# Patient Record
Sex: MALE | Race: WHITE | NOT HISPANIC OR LATINO | Employment: FULL TIME | URBAN - METROPOLITAN AREA
[De-identification: names, ages, dates, MRNs, and addresses within clinical notes are randomized per-mention and may not be internally consistent; named-entity substitution may affect disease eponyms.]

---

## 2021-06-10 ENCOUNTER — APPOINTMENT (OUTPATIENT)
Dept: CT IMAGING | Facility: CLINIC | Age: 36
DRG: 372 | End: 2021-06-10
Attending: EMERGENCY MEDICINE
Payer: COMMERCIAL

## 2021-06-10 ENCOUNTER — HOSPITAL ENCOUNTER (INPATIENT)
Facility: CLINIC | Age: 36
LOS: 3 days | Discharge: HOME | DRG: 372 | End: 2021-06-13
Attending: EMERGENCY MEDICINE | Admitting: INTERNAL MEDICINE
Payer: COMMERCIAL

## 2021-06-10 DIAGNOSIS — Z11.52 ENCOUNTER FOR SCREENING LABORATORY TESTING FOR SEVERE ACUTE RESPIRATORY SYNDROME CORONAVIRUS 2 (SARS-COV-2): ICD-10-CM

## 2021-06-10 DIAGNOSIS — K57.20 DIVERTICULITIS OF LARGE INTESTINE WITH PERFORATION WITHOUT BLEEDING: ICD-10-CM

## 2021-06-10 LAB
ALBUMIN SERPL-MCNC: 3.7 G/DL (ref 3.4–5)
ALBUMIN UR-MCNC: NEGATIVE MG/DL
ALP SERPL-CCNC: 74 U/L (ref 40–150)
ALT SERPL W P-5'-P-CCNC: 33 U/L (ref 0–70)
ANION GAP SERPL CALCULATED.3IONS-SCNC: 7 MMOL/L (ref 3–14)
APPEARANCE UR: CLEAR
AST SERPL W P-5'-P-CCNC: 16 U/L (ref 0–45)
BILIRUB SERPL-MCNC: 1 MG/DL (ref 0.2–1.3)
BILIRUB UR QL STRIP: NEGATIVE
BUN SERPL-MCNC: 8 MG/DL (ref 7–30)
CALCIUM SERPL-MCNC: 9.6 MG/DL (ref 8.5–10.1)
CHLORIDE SERPL-SCNC: 99 MMOL/L (ref 94–109)
CO2 SERPL-SCNC: 27 MMOL/L (ref 20–32)
COLOR UR AUTO: YELLOW
CREAT SERPL-MCNC: 0.94 MG/DL (ref 0.66–1.25)
GFR SERPL CREATININE-BSD FRML MDRD: >90 ML/MIN/{1.73_M2}
GLUCOSE SERPL-MCNC: 103 MG/DL (ref 70–99)
GLUCOSE UR STRIP-MCNC: NEGATIVE MG/DL
HGB UR QL STRIP: NEGATIVE
KETONES UR STRIP-MCNC: 5 MG/DL
LABORATORY COMMENT REPORT: NORMAL
LACTATE BLD-SCNC: 1.1 MMOL/L (ref 0.7–2)
LEUKOCYTE ESTERASE UR QL STRIP: NEGATIVE
NITRATE UR QL: NEGATIVE
PH UR STRIP: 9 PH (ref 5–7)
POTASSIUM SERPL-SCNC: 3.4 MMOL/L (ref 3.4–5.3)
PROT SERPL-MCNC: 7.9 G/DL (ref 6.8–8.8)
SARS-COV-2 RNA RESP QL NAA+PROBE: NEGATIVE
SODIUM SERPL-SCNC: 133 MMOL/L (ref 133–144)
SOURCE: ABNORMAL
SP GR UR STRIP: 1 (ref 1–1.03)
SPECIMEN SOURCE: NORMAL
UROBILINOGEN UR STRIP-MCNC: 0 MG/DL (ref 0–2)

## 2021-06-10 PROCEDURE — 74177 CT ABD & PELVIS W/CONTRAST: CPT

## 2021-06-10 PROCEDURE — 250N000009 HC RX 250: Performed by: EMERGENCY MEDICINE

## 2021-06-10 PROCEDURE — 87635 SARS-COV-2 COVID-19 AMP PRB: CPT | Performed by: EMERGENCY MEDICINE

## 2021-06-10 PROCEDURE — 250N000013 HC RX MED GY IP 250 OP 250 PS 637: Performed by: INTERNAL MEDICINE

## 2021-06-10 PROCEDURE — 99221 1ST HOSP IP/OBS SF/LOW 40: CPT | Mod: AI | Performed by: INTERNAL MEDICINE

## 2021-06-10 PROCEDURE — 87506 IADNA-DNA/RNA PROBE TQ 6-11: CPT | Performed by: EMERGENCY MEDICINE

## 2021-06-10 PROCEDURE — 96375 TX/PRO/DX INJ NEW DRUG ADDON: CPT | Performed by: EMERGENCY MEDICINE

## 2021-06-10 PROCEDURE — 99207 PR CDG-HISTORY COMPONENT: MEETS DETAILED - DOWN CODED LACK OF ROS: CPT | Performed by: INTERNAL MEDICINE

## 2021-06-10 PROCEDURE — 99285 EMERGENCY DEPT VISIT HI MDM: CPT | Performed by: EMERGENCY MEDICINE

## 2021-06-10 PROCEDURE — 96365 THER/PROPH/DIAG IV INF INIT: CPT | Mod: 59 | Performed by: EMERGENCY MEDICINE

## 2021-06-10 PROCEDURE — 258N000003 HC RX IP 258 OP 636: Performed by: EMERGENCY MEDICINE

## 2021-06-10 PROCEDURE — 250N000011 HC RX IP 250 OP 636: Performed by: INTERNAL MEDICINE

## 2021-06-10 PROCEDURE — 96361 HYDRATE IV INFUSION ADD-ON: CPT | Performed by: EMERGENCY MEDICINE

## 2021-06-10 PROCEDURE — 80053 COMPREHEN METABOLIC PANEL: CPT | Performed by: EMERGENCY MEDICINE

## 2021-06-10 PROCEDURE — 81001 URINALYSIS AUTO W/SCOPE: CPT | Performed by: EMERGENCY MEDICINE

## 2021-06-10 PROCEDURE — 83605 ASSAY OF LACTIC ACID: CPT | Performed by: EMERGENCY MEDICINE

## 2021-06-10 PROCEDURE — C9803 HOPD COVID-19 SPEC COLLECT: HCPCS | Performed by: EMERGENCY MEDICINE

## 2021-06-10 PROCEDURE — 99285 EMERGENCY DEPT VISIT HI MDM: CPT | Mod: 25 | Performed by: EMERGENCY MEDICINE

## 2021-06-10 PROCEDURE — 250N000011 HC RX IP 250 OP 636: Performed by: EMERGENCY MEDICINE

## 2021-06-10 PROCEDURE — 120N000001 HC R&B MED SURG/OB

## 2021-06-10 PROCEDURE — 96376 TX/PRO/DX INJ SAME DRUG ADON: CPT | Performed by: EMERGENCY MEDICINE

## 2021-06-10 RX ORDER — IOPAMIDOL 755 MG/ML
500 INJECTION, SOLUTION INTRAVASCULAR ONCE
Status: COMPLETED | OUTPATIENT
Start: 2021-06-10 | End: 2021-06-10

## 2021-06-10 RX ORDER — SODIUM CHLORIDE 9 MG/ML
INJECTION, SOLUTION INTRAVENOUS CONTINUOUS
Status: DISCONTINUED | OUTPATIENT
Start: 2021-06-10 | End: 2021-06-11

## 2021-06-10 RX ORDER — NALOXONE HYDROCHLORIDE 0.4 MG/ML
0.2 INJECTION, SOLUTION INTRAMUSCULAR; INTRAVENOUS; SUBCUTANEOUS
Status: DISCONTINUED | OUTPATIENT
Start: 2021-06-10 | End: 2021-06-13 | Stop reason: HOSPADM

## 2021-06-10 RX ORDER — NALOXONE HYDROCHLORIDE 0.4 MG/ML
0.4 INJECTION, SOLUTION INTRAMUSCULAR; INTRAVENOUS; SUBCUTANEOUS
Status: DISCONTINUED | OUTPATIENT
Start: 2021-06-10 | End: 2021-06-13 | Stop reason: HOSPADM

## 2021-06-10 RX ORDER — HYDROMORPHONE HYDROCHLORIDE 1 MG/ML
0.5 INJECTION, SOLUTION INTRAMUSCULAR; INTRAVENOUS; SUBCUTANEOUS
Status: DISCONTINUED | OUTPATIENT
Start: 2021-06-10 | End: 2021-06-10 | Stop reason: CLARIF

## 2021-06-10 RX ORDER — ACETAMINOPHEN 325 MG/1
650 TABLET ORAL EVERY 4 HOURS PRN
Status: DISCONTINUED | OUTPATIENT
Start: 2021-06-10 | End: 2021-06-11

## 2021-06-10 RX ORDER — POTASSIUM CHLORIDE 1500 MG/1
40 TABLET, EXTENDED RELEASE ORAL ONCE
Status: COMPLETED | OUTPATIENT
Start: 2021-06-10 | End: 2021-06-10

## 2021-06-10 RX ORDER — ONDANSETRON 2 MG/ML
4 INJECTION INTRAMUSCULAR; INTRAVENOUS ONCE
Status: COMPLETED | OUTPATIENT
Start: 2021-06-10 | End: 2021-06-10

## 2021-06-10 RX ORDER — ONDANSETRON 4 MG/1
4 TABLET, ORALLY DISINTEGRATING ORAL EVERY 6 HOURS PRN
Status: DISCONTINUED | OUTPATIENT
Start: 2021-06-10 | End: 2021-06-13 | Stop reason: HOSPADM

## 2021-06-10 RX ORDER — HYDROMORPHONE HYDROCHLORIDE 1 MG/ML
.5-1 INJECTION, SOLUTION INTRAMUSCULAR; INTRAVENOUS; SUBCUTANEOUS EVERY 4 HOURS PRN
Status: DISCONTINUED | OUTPATIENT
Start: 2021-06-10 | End: 2021-06-12

## 2021-06-10 RX ORDER — ONDANSETRON 2 MG/ML
4 INJECTION INTRAMUSCULAR; INTRAVENOUS EVERY 6 HOURS PRN
Status: DISCONTINUED | OUTPATIENT
Start: 2021-06-10 | End: 2021-06-13 | Stop reason: HOSPADM

## 2021-06-10 RX ORDER — LORAZEPAM 2 MG/ML
1 INJECTION INTRAMUSCULAR ONCE
Status: COMPLETED | OUTPATIENT
Start: 2021-06-10 | End: 2021-06-10

## 2021-06-10 RX ORDER — IOPAMIDOL 755 MG/ML
500 INJECTION, SOLUTION INTRAVASCULAR ONCE
Status: DISCONTINUED | OUTPATIENT
Start: 2021-06-10 | End: 2021-06-10

## 2021-06-10 RX ORDER — LIDOCAINE 40 MG/G
CREAM TOPICAL
Status: DISCONTINUED | OUTPATIENT
Start: 2021-06-10 | End: 2021-06-13 | Stop reason: HOSPADM

## 2021-06-10 RX ADMIN — POTASSIUM CHLORIDE 40 MEQ: 1500 TABLET, EXTENDED RELEASE ORAL at 20:28

## 2021-06-10 RX ADMIN — IOPAMIDOL 100 ML: 755 INJECTION, SOLUTION INTRAVENOUS at 14:01

## 2021-06-10 RX ADMIN — TAZOBACTAM SODIUM AND PIPERACILLIN SODIUM 3.38 G: 375; 3 INJECTION, SOLUTION INTRAVENOUS at 20:28

## 2021-06-10 RX ADMIN — TAZOBACTAM SODIUM AND PIPERACILLIN SODIUM 3.38 G: 375; 3 INJECTION, SOLUTION INTRAVENOUS at 15:47

## 2021-06-10 RX ADMIN — HYDROMORPHONE HYDROCHLORIDE 0.5 MG: 1 INJECTION, SOLUTION INTRAMUSCULAR; INTRAVENOUS; SUBCUTANEOUS at 15:24

## 2021-06-10 RX ADMIN — LORAZEPAM 1 MG: 2 INJECTION INTRAMUSCULAR; INTRAVENOUS at 12:42

## 2021-06-10 RX ADMIN — HYDROMORPHONE HYDROCHLORIDE 1 MG: 1 INJECTION, SOLUTION INTRAMUSCULAR; INTRAVENOUS; SUBCUTANEOUS at 22:39

## 2021-06-10 RX ADMIN — SODIUM CHLORIDE: 9 INJECTION, SOLUTION INTRAVENOUS at 23:42

## 2021-06-10 RX ADMIN — SODIUM CHLORIDE 57 ML: 9 INJECTION, SOLUTION INTRAVENOUS at 14:01

## 2021-06-10 RX ADMIN — ONDANSETRON 4 MG: 2 INJECTION INTRAMUSCULAR; INTRAVENOUS at 12:48

## 2021-06-10 RX ADMIN — SODIUM CHLORIDE 1000 ML: 9 INJECTION, SOLUTION INTRAVENOUS at 12:35

## 2021-06-10 RX ADMIN — ENOXAPARIN SODIUM 40 MG: 40 INJECTION SUBCUTANEOUS at 20:27

## 2021-06-10 RX ADMIN — SODIUM CHLORIDE: 9 INJECTION, SOLUTION INTRAVENOUS at 15:24

## 2021-06-10 RX ADMIN — HYDROMORPHONE HYDROCHLORIDE 0.5 MG: 1 INJECTION, SOLUTION INTRAMUSCULAR; INTRAVENOUS; SUBCUTANEOUS at 19:04

## 2021-06-10 ASSESSMENT — MIFFLIN-ST. JEOR
SCORE: 2156.42
SCORE: 2151.89

## 2021-06-10 ASSESSMENT — ACTIVITIES OF DAILY LIVING (ADL)
WALKING_OR_CLIMBING_STAIRS_DIFFICULTY: NO
TOILETING_ISSUES: NO
DOING_ERRANDS_INDEPENDENTLY_DIFFICULTY: NO
DIFFICULTY_EATING/SWALLOWING: NO
DIFFICULTY_COMMUNICATING: NO
ADLS_ACUITY_SCORE: 11
CONCENTRATING,_REMEMBERING_OR_MAKING_DECISIONS_DIFFICULTY: NO
DRESSING/BATHING_DIFFICULTY: NO
WEAR_GLASSES_OR_BLIND: NO
FALL_HISTORY_WITHIN_LAST_SIX_MONTHS: NO

## 2021-06-10 NOTE — ED PROVIDER NOTES
History     Chief Complaint   Patient presents with     Abdominal Pain     Diarrhea     HPI  Robin Velasco is a 35 year old male who presents with complaints of nonbloody diarrhea and abdominal pain.  Patient states that last Thursday he developed some abdominal cramping and had episodes of diarrhea.  These slowly improved over the next few days with Imodium.  However he presents today as the diarrhea has recurred and intensified.  He has no left lower quadrant abdominal pain that is moderate in intensity.  Does not radiate to his back.  No associated nausea or vomiting.  Has had no fever but has felt chilled.  His wife did have some diarrhea a day or 2 before his onset of symptoms but hers cleared quickly.  No recent travel.  No antibiotic use.  Never had a colonoscopy.  Family history of diverticular disease and early colon polyps but no history of Crohn's, ulcerative colitis, or colon cancer.  Patient went to a urgent care today and had an x-ray obtained which did not show any worrisome gas pattern.  White count was elevated at 18,000.  Did not have stool cultures or other work-up and sent here for further evaluation.    Allergies:  No Known Allergies    Problem List:    Patient Active Problem List    Diagnosis Date Noted     Diverticulitis of large intestine with perforation without bleeding 06/10/2021     Priority: Medium        Past Medical History:    No past medical history on file.    Past Surgical History:    Past Surgical History:   Procedure Laterality Date     DISCECTOMY LUMBAR POSTERIOR MICROSCOPIC ONE LEVEL  10/18/2011    Procedure:DISCECTOMY LUMBAR POSTERIOR MICROSCOPIC ONE LEVEL; LEFT L5-S1 HEMILAMINECTOMY MICRODISCECTOMY WITH METRIX II ; Surgeon:APRIL SEGURA; Location: OR     ENT SURGERY         Family History:    No family history on file.    Social History:  Marital Status:   [2]  Social History     Tobacco Use     Smoking status: Never Smoker   Substance Use Topics      "Alcohol use: Yes     Comment: 1 month     Drug use: No        Medications:    gabapentin (NEURONTIN) 300 MG capsule  methocarbamol (ROBAXIN) 750 MG tablet  oxycodone-acetaminophen (PERCOCET) 5-325 MG per tablet          Review of Systems all other systems are reviewed and are negative.    Physical Exam   BP: (!) 146/99  Pulse: 122  Temp: 100  F (37.8  C)  Resp: 19  Height: 188 cm (6' 2\")  Weight: 115.7 kg (255 lb)  SpO2: 98 %      Physical Exam General alert cooperative male in mild to moderate stress.  HEENT shows no scleral icterus.  Speech is clear.  Neck is supple.  Lungs were clear without adventitious sounds.  Cardiac auscultation reveals tachycardia but regular and no murmurs appreciated.  Back reveals no CVA tenderness.  Abdominal exam reveals active bowel sounds on palpation he is tender in the left lower quadrant with voluntary guarding but no rebound.  No organomegaly or masses.  No skin rash over flank or abdomen.    ED Course        Procedures               Critical Care time:  none               Results for orders placed or performed during the hospital encounter of 06/10/21 (from the past 24 hour(s))   Comprehensive metabolic panel   Result Value Ref Range    Sodium 133 133 - 144 mmol/L    Potassium 3.4 3.4 - 5.3 mmol/L    Chloride 99 94 - 109 mmol/L    Carbon Dioxide 27 20 - 32 mmol/L    Anion Gap 7 3 - 14 mmol/L    Glucose 103 (H) 70 - 99 mg/dL    Urea Nitrogen 8 7 - 30 mg/dL    Creatinine 0.94 0.66 - 1.25 mg/dL    GFR Estimate >90 >60 mL/min/[1.73_m2]    GFR Estimate If Black >90 >60 mL/min/[1.73_m2]    Calcium 9.6 8.5 - 10.1 mg/dL    Bilirubin Total 1.0 0.2 - 1.3 mg/dL    Albumin 3.7 3.4 - 5.0 g/dL    Protein Total 7.9 6.8 - 8.8 g/dL    Alkaline Phosphatase 74 40 - 150 U/L    ALT 33 0 - 70 U/L    AST 16 0 - 45 U/L   Lactic acid whole blood   Result Value Ref Range    Lactic Acid 1.1 0.7 - 2.0 mmol/L   CT Abdomen Pelvis w Contrast    Narrative    CT ABDOMEN PELVIS W CONTRAST 6/10/2021 2:07 " PM    CLINICAL HISTORY: Left lower quadrant pain    TECHNIQUE: CT scan of the abdomen and pelvis was performed following  injection of IV contrast. Multiplanar reformats were obtained. Dose  reduction techniques were used.  CONTRAST: 100 mL Isovue 370     COMPARISON: None.    FINDINGS:   LOWER CHEST: Normal.    HEPATOBILIARY: Normal.    PANCREAS: Normal.    SPLEEN: Normal.    ADRENAL GLANDS: Normal.    KIDNEYS/BLADDER: Normal.    BOWEL: Mild sigmoid diverticulosis. Wall thickening and inflammatory  changes surrounding the diverticular segment of the sigmoid colon. A  few punctate foci of extraluminal air extend superior to the inflamed  sigmoid colon (series 3, image 172). Findings are compatible with  acute sigmoid diverticulitis with perforation. No abscess. No small  bowel or colonic obstruction. Normal appendix. Small hiatal hernia.    PELVIC ORGANS: Normal.    ADDITIONAL FINDINGS: No lymphadenopathy in the abdomen and pelvis. A  few prominent right mesenteric lymph nodes are likely reactive. No  free fluid.    MUSCULOSKELETAL: Normal.      Impression    IMPRESSION:   1.  Acute sigmoid diverticulitis with perforation and few small foci  of adjacent free air. No abscess.    NERY DAMICO MD   UA reflex to Microscopic   Result Value Ref Range    Color Urine Yellow     Appearance Urine Clear     Glucose Urine Negative NEG^Negative mg/dL    Bilirubin Urine Negative NEG^Negative    Ketones Urine 5 (A) NEG^Negative mg/dL    Specific Gravity Urine 1.005 1.003 - 1.035    Blood Urine Negative NEG^Negative    pH Urine 9.0 (H) 5.0 - 7.0 pH    Protein Albumin Urine Negative NEG^Negative mg/dL    Urobilinogen mg/dL 0.0 0.0 - 2.0 mg/dL    Nitrite Urine Negative NEG^Negative    Leukocyte Esterase Urine Negative NEG^Negative    Source Midstream Urine        Medications   0.9% sodium chloride BOLUS (0 mLs Intravenous Stopped 6/10/21 1500)     Followed by   sodium chloride 0.9% infusion ( Intravenous New Bag 6/10/21 1524)    piperacillin-tazobactam (ZOSYN) infusion 3.375 g (3.375 g Intravenous New Bag 6/10/21 1547)   HYDROmorphone (PF) (DILAUDID) injection 0.5 mg (0.5 mg Intravenous Given 6/10/21 1524)   LORazepam (ATIVAN) injection 1 mg (1 mg Intravenous Given 6/10/21 1242)   ondansetron (ZOFRAN) injection 4 mg (4 mg Intravenous Given 6/10/21 1248)   sodium chloride (PF) 0.9% PF flush 3 mL (10 mLs Intravenous Given 6/10/21 1401)   Saline Bag 100mL  CT  flush use only (57 mLs Intravenous Given 6/10/21 1401)   iopamidol (ISOVUE-370) solution 500 mL (100 mLs Intravenous Given 6/10/21 1401)     IV established and patient given fluids and Ativan as he is quite anxious.  This improved his anxiety but his pain persisted.  Additional blood work as noted above.  CT shows sigmoid diverticulitis with microperforation.  He is given IV Zosyn.  Assessments & Plan (with Medical Decision Making)   Robin Velasco is a 35 year old male who presents with complaints of nonbloody diarrhea and abdominal pain.  Patient states that last Thursday he developed some abdominal cramping and had episodes of diarrhea.  These slowly improved over the next few days with Imodium.  However he presents today as the diarrhea has recurred and intensified.  He has no left lower quadrant abdominal pain that is moderate in intensity.  Does not radiate to his back.  No associated nausea or vomiting.  Has had no fever but has felt chilled.  His wife did have some diarrhea a day or 2 before his onset of symptoms but hers cleared quickly.  No recent travel.  No antibiotic use.  Never had a colonoscopy.  Family history of diverticular disease and early colon polyps but no history of Crohn's, ulcerative colitis, or colon cancer.  Patient went to a urgent care today and had an x-ray obtained which did not show any worrisome gas pattern.  White count was elevated at 18,000.  Did not have stool cultures or other work-up and sent here for further evaluation.  On presentation  patient was tachycardic with low-grade fever.  He was not hypotensive.  Is not hypoxic.  Did not have CVA tenderness.  Did have left lower quadrant tenderness with voluntary guarding but no rebound.  He had a white count done in outpatient clinic at over 18,000.  The remainder blood work as noted above and normal.  A CT of the abdomen shows diverticular disease without abscess but microperforation.  He was given IV Zosyn.  I spoke to Dr. Torrez from the surgery and she recommends admission for IV antibiotics.  No surgery at this time.  I then spoke to Dr. Andersen from hospital services and he will assume care on admission.  I have reviewed the nursing notes.    I have reviewed the findings, diagnosis, plan and need for follow up with the patient.       New Prescriptions    No medications on file       Final diagnoses:   Diverticulitis of large intestine with perforation without bleeding       6/10/2021   [facility] EMERGENCY DEPT     Elliott Patterson MD  06/10/21 4198

## 2021-06-10 NOTE — ED TRIAGE NOTES
Abdominal pain with diarrhea for about 1 week, diarrhea as well.  Is feeling nauseated.  Did have a fever today and chills throughout the night.  Was just at [facility] in [place] and sent here for CT

## 2021-06-10 NOTE — H&P
[facility]    History and Physical - Hospitalist Service       Date of Admission:  6/10/2021    Assessment & Plan          Robin Velasco is a 35 year old male admitted on 6/10/2021. No significant past medical history except for back surgery. He has been admitted with left lower quadrant pain, nausea, diarrhea for about a week. He was found to have mild sigmoid diverticulitis with perforation. Will manage conservatively with iv zosyn, iv hydration and pain control, NPO. Consulted surgery.        A/p :         Acute onset left side abdominal pain : 2/2 diverticulitis.      Acute, mild, sigmoid diverticulitis with perforation : manage conservatively with iv antibiotics, NPO status, iv hydration and pain control, consulted surgery.      SIRS : 2/2 diverticulitis.      Hypokalemia, mild : supplement prn              Diet:   NPO  DVT Prophylaxis: Enoxaparin (Lovenox) SQ  [place] Catheter: not present  Code Status:   full          Disposition Plan   Expected discharge: 2 - 3 days, recommended to prior living arrangement once abdominal symptoms resolve.  Entered: Papo Andersen MD 06/10/2021, 6:05 PM     The patient's care was discussed with the Patient.    Papo Andersen MD  [facility]  Contact information available via [facility] Paging/Directory      ______________________________________________________________________    Chief Complaint   Abdominal pain    History is obtained from the patient    History of Present Illness         Robin Velasco is a 35 year old male admitted on 6/10/2021.     No significant past medical history except for back surgery.     He has been admitted with left lower quadrant pain, nausea, diarrhea for about a week. He has been having off and on pain of moderate severity better now after pain meds. He had diarrheas for 6-7 times over last few days and had a fever of around 100.    He was found to have mild sigmoid diverticulitis  with perforation.     Patient subsequently came to the ER for further evaluation and management.    Will manage conservatively with iv zosyn, iv hydration and pain control, NPO. Consulted surgery.          Review of Systems          No cp, sob, cough or phlegm  No blood In stools  No urinary symptoms  No neuro symptoms        Past Medical History    I have reviewed this patient's medical history and updated it with pertinent information if needed.   No past medical history on file.    Past Surgical History   I have reviewed this patient's surgical history and updated it with pertinent information if needed.  Past Surgical History:   Procedure Laterality Date     DISCECTOMY LUMBAR POSTERIOR MICROSCOPIC ONE LEVEL  10/18/2011    Procedure:DISCECTOMY LUMBAR POSTERIOR MICROSCOPIC ONE LEVEL; LEFT L5-S1 HEMILAMINECTOMY MICRODISCECTOMY WITH METRIX II ; Surgeon:APRIL SEGURA; Location: OR     ENT SURGERY         Social History   I have reviewed this patient's social history and updated it with pertinent information if needed.  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 Taking?   gabapentin (NEURONTIN) 300 MG capsule  Self No No   Sig: Take 3 capsules by mouth nightly as needed (nerve pain).   methocarbamol (ROBAXIN) 750 MG tablet  Self No No   Sig: Take 1 tablet by mouth every 6 hours as needed.   oxycodone-acetaminophen (PERCOCET) 5-325 MG per tablet  Self No No   Sig: Take 1-2 tablets by mouth every 4 hours as needed.      Facility-Administered Medications: None     Allergies   No Known Allergies    Physical  Exam   Vital Signs: Temp: 100  F (37.8  C) Temp src: Oral BP: 128/82 Pulse: 100   Resp: 16 SpO2: 96 % O2 Device: None (Room air)    Weight: 255 lbs 0 oz       GENERAL: The patient is not in any acute distressed. Awake and alert.  HEENT: Nonicteric sclerae, PERRLA, EOMI. Oropharynx clear. Moist mucous membranes. Conjunctivae appear well perfused.  HEART: Regular rate and rhythm without murmurs.  LUNGS: Clear to auscultation bilaterally. No wheezing or crackles.  ABDOMEN: Soft, positive bowel sounds, left lower abdomen tenderness.  SKIN: No rash, no excessive bruising, petechiae, or purpura.  EXTREMITIES : no rashes, no swelling in legs.  NEUROLOGIC: AxO x 3.  Cranial nerves II-XII intact without motor/sensory deficit.      Data   Data reviewed today: I reviewed all medications, new labs and imaging results over the last 24 hours. I personally reviewed no images or EKG's today.    Recent Labs   Lab 06/10/21  1235      POTASSIUM 3.4   CHLORIDE 99   CO2 27   BUN 8   CR 0.94   ANIONGAP 7   KELLEN 9.6   *   ALBUMIN 3.7   PROTTOTAL 7.9   BILITOTAL 1.0   ALKPHOS 74   ALT 33   AST 16

## 2021-06-10 NOTE — ED NOTES
"Pt reports \"stomach pain on left lower side.\"  Onset last Thursday intermittently and constant yesterday afternoon.  Describes as \"pressure, cramping, and painful when I stretch my abd around.\"  Pt reports diarrhea the entire time but worst last night.  Pt reports nausea but denies vomiting.  Pt denies other symptoms.    "

## 2021-06-11 PROBLEM — A04.5 CAMPYLOBACTER DIARRHEA: Status: ACTIVE | Noted: 2021-06-11

## 2021-06-11 PROBLEM — R65.10 SIRS (SYSTEMIC INFLAMMATORY RESPONSE SYNDROME) (H): Status: ACTIVE | Noted: 2021-06-11

## 2021-06-11 PROBLEM — E87.6 HYPOPOTASSEMIA: Status: ACTIVE | Noted: 2021-06-11

## 2021-06-11 LAB
ANION GAP SERPL CALCULATED.3IONS-SCNC: 5 MMOL/L (ref 3–14)
BASOPHILS # BLD AUTO: 0 10E9/L (ref 0–0.2)
BASOPHILS NFR BLD AUTO: 0.3 %
BUN SERPL-MCNC: 10 MG/DL (ref 7–30)
C COLI+JEJUNI+LARI FUSA STL QL NAA+PROBE: ABNORMAL
CALCIUM SERPL-MCNC: 8.6 MG/DL (ref 8.5–10.1)
CHLORIDE SERPL-SCNC: 106 MMOL/L (ref 94–109)
CO2 SERPL-SCNC: 25 MMOL/L (ref 20–32)
CREAT SERPL-MCNC: 1 MG/DL (ref 0.66–1.25)
DIFFERENTIAL METHOD BLD: ABNORMAL
EC STX1 GENE STL QL NAA+PROBE: NOT DETECTED
EC STX2 GENE STL QL NAA+PROBE: NOT DETECTED
ENTERIC PATHOGEN COMMENT: ABNORMAL
EOSINOPHIL # BLD AUTO: 0.2 10E9/L (ref 0–0.7)
EOSINOPHIL NFR BLD AUTO: 1.3 %
ERYTHROCYTE [DISTWIDTH] IN BLOOD BY AUTOMATED COUNT: 13.2 % (ref 10–15)
GFR SERPL CREATININE-BSD FRML MDRD: >90 ML/MIN/{1.73_M2}
GLUCOSE SERPL-MCNC: 88 MG/DL (ref 70–99)
HCT VFR BLD AUTO: 41.1 % (ref 40–53)
HGB BLD-MCNC: 14 G/DL (ref 13.3–17.7)
IMM GRANULOCYTES # BLD: 0.1 10E9/L (ref 0–0.4)
IMM GRANULOCYTES NFR BLD: 0.7 %
LYMPHOCYTES # BLD AUTO: 1.5 10E9/L (ref 0.8–5.3)
LYMPHOCYTES NFR BLD AUTO: 13.5 %
MCH RBC QN AUTO: 31 PG (ref 26.5–33)
MCHC RBC AUTO-ENTMCNC: 34.1 G/DL (ref 31.5–36.5)
MCV RBC AUTO: 91 FL (ref 78–100)
MONOCYTES # BLD AUTO: 1.2 10E9/L (ref 0–1.3)
MONOCYTES NFR BLD AUTO: 10.3 %
NEUTROPHILS # BLD AUTO: 8.4 10E9/L (ref 1.6–8.3)
NEUTROPHILS NFR BLD AUTO: 73.9 %
NOROV GI+II ORF1-ORF2 JNC STL QL NAA+PR: NOT DETECTED
NRBC # BLD AUTO: 0 10*3/UL
NRBC BLD AUTO-RTO: 0 /100
PLATELET # BLD AUTO: 216 10E9/L (ref 150–450)
POTASSIUM SERPL-SCNC: 3.6 MMOL/L (ref 3.4–5.3)
POTASSIUM SERPL-SCNC: 3.8 MMOL/L (ref 3.4–5.3)
RBC # BLD AUTO: 4.52 10E12/L (ref 4.4–5.9)
RVA NSP5 STL QL NAA+PROBE: NOT DETECTED
SALMONELLA SP RPOD STL QL NAA+PROBE: NOT DETECTED
SHIGELLA SP+EIEC IPAH STL QL NAA+PROBE: NOT DETECTED
SODIUM SERPL-SCNC: 136 MMOL/L (ref 133–144)
V CHOL+PARA RFBL+TRKH+TNAA STL QL NAA+PR: NOT DETECTED
WBC # BLD AUTO: 11.3 10E9/L (ref 4–11)
Y ENTERO RECN STL QL NAA+PROBE: NOT DETECTED

## 2021-06-11 PROCEDURE — 36415 COLL VENOUS BLD VENIPUNCTURE: CPT | Performed by: INTERNAL MEDICINE

## 2021-06-11 PROCEDURE — 80048 BASIC METABOLIC PNL TOTAL CA: CPT | Performed by: INTERNAL MEDICINE

## 2021-06-11 PROCEDURE — 250N000011 HC RX IP 250 OP 636: Performed by: PEDIATRICS

## 2021-06-11 PROCEDURE — 250N000011 HC RX IP 250 OP 636: Performed by: EMERGENCY MEDICINE

## 2021-06-11 PROCEDURE — 120N000001 HC R&B MED SURG/OB

## 2021-06-11 PROCEDURE — 250N000013 HC RX MED GY IP 250 OP 250 PS 637: Performed by: PEDIATRICS

## 2021-06-11 PROCEDURE — 258N000003 HC RX IP 258 OP 636: Performed by: EMERGENCY MEDICINE

## 2021-06-11 PROCEDURE — 99222 1ST HOSP IP/OBS MODERATE 55: CPT | Performed by: SPECIALIST

## 2021-06-11 PROCEDURE — 84132 ASSAY OF SERUM POTASSIUM: CPT | Performed by: INTERNAL MEDICINE

## 2021-06-11 PROCEDURE — 258N000003 HC RX IP 258 OP 636: Performed by: PEDIATRICS

## 2021-06-11 PROCEDURE — 99233 SBSQ HOSP IP/OBS HIGH 50: CPT | Performed by: PEDIATRICS

## 2021-06-11 PROCEDURE — 99207 PR CDG-MDM COMPONENT: MEETS HIGH - UP CODED: CPT | Performed by: PEDIATRICS

## 2021-06-11 PROCEDURE — 250N000011 HC RX IP 250 OP 636: Performed by: INTERNAL MEDICINE

## 2021-06-11 PROCEDURE — 85025 COMPLETE CBC W/AUTO DIFF WBC: CPT | Performed by: INTERNAL MEDICINE

## 2021-06-11 RX ORDER — SODIUM CHLORIDE, SODIUM LACTATE, POTASSIUM CHLORIDE, CALCIUM CHLORIDE 600; 310; 30; 20 MG/100ML; MG/100ML; MG/100ML; MG/100ML
INJECTION, SOLUTION INTRAVENOUS CONTINUOUS
Status: DISCONTINUED | OUTPATIENT
Start: 2021-06-11 | End: 2021-06-12

## 2021-06-11 RX ORDER — AZITHROMYCIN 250 MG/1
500 TABLET, FILM COATED ORAL DAILY
Status: COMPLETED | OUTPATIENT
Start: 2021-06-11 | End: 2021-06-13

## 2021-06-11 RX ORDER — CEFTRIAXONE 2 G/1
2 INJECTION, POWDER, FOR SOLUTION INTRAMUSCULAR; INTRAVENOUS EVERY 24 HOURS
Status: DISCONTINUED | OUTPATIENT
Start: 2021-06-11 | End: 2021-06-13 | Stop reason: HOSPADM

## 2021-06-11 RX ADMIN — HYDROMORPHONE HYDROCHLORIDE 0.5 MG: 1 INJECTION, SOLUTION INTRAMUSCULAR; INTRAVENOUS; SUBCUTANEOUS at 15:02

## 2021-06-11 RX ADMIN — HYDROMORPHONE HYDROCHLORIDE 1 MG: 1 INJECTION, SOLUTION INTRAMUSCULAR; INTRAVENOUS; SUBCUTANEOUS at 09:13

## 2021-06-11 RX ADMIN — SODIUM CHLORIDE, POTASSIUM CHLORIDE, SODIUM LACTATE AND CALCIUM CHLORIDE: 600; 310; 30; 20 INJECTION, SOLUTION INTRAVENOUS at 15:06

## 2021-06-11 RX ADMIN — TAZOBACTAM SODIUM AND PIPERACILLIN SODIUM 3.38 G: 375; 3 INJECTION, SOLUTION INTRAVENOUS at 03:14

## 2021-06-11 RX ADMIN — HYDROMORPHONE HYDROCHLORIDE 0.5 MG: 1 INJECTION, SOLUTION INTRAMUSCULAR; INTRAVENOUS; SUBCUTANEOUS at 19:52

## 2021-06-11 RX ADMIN — ENOXAPARIN SODIUM 40 MG: 40 INJECTION SUBCUTANEOUS at 19:52

## 2021-06-11 RX ADMIN — METRONIDAZOLE 500 MG: 500 INJECTION, SOLUTION INTRAVENOUS at 15:51

## 2021-06-11 RX ADMIN — CEFTRIAXONE SODIUM 2 G: 2 INJECTION, POWDER, FOR SOLUTION INTRAMUSCULAR; INTRAVENOUS at 14:19

## 2021-06-11 RX ADMIN — TAZOBACTAM SODIUM AND PIPERACILLIN SODIUM 3.38 G: 375; 3 INJECTION, SOLUTION INTRAVENOUS at 09:15

## 2021-06-11 RX ADMIN — AZITHROMYCIN MONOHYDRATE 500 MG: 250 TABLET ORAL at 15:05

## 2021-06-11 RX ADMIN — SODIUM CHLORIDE: 9 INJECTION, SOLUTION INTRAVENOUS at 08:18

## 2021-06-11 RX ADMIN — HYDROMORPHONE HYDROCHLORIDE 1 MG: 1 INJECTION, SOLUTION INTRAMUSCULAR; INTRAVENOUS; SUBCUTANEOUS at 04:49

## 2021-06-11 RX ADMIN — METRONIDAZOLE 500 MG: 500 INJECTION, SOLUTION INTRAVENOUS at 22:24

## 2021-06-11 ASSESSMENT — ACTIVITIES OF DAILY LIVING (ADL)
ADLS_ACUITY_SCORE: 11

## 2021-06-11 NOTE — PLAN OF CARE
Neuro: A/Ox4  Pulmonary: LS CTA on RA  CV: SR  GI: Bowel sounds hypoactive in all quadrants, No bowel movents this shift. Dilaudid given for pain x1  : Voiding adequately, urinal at bedside  Skin: CDI  Lines/Tubes/Drains: PIV x1  Drips: NS running 124, pt NPO except     K+ 3.6 AM check ordered    Plan: Will continue to monitor GI, pain, and POC.

## 2021-06-11 NOTE — CONSULTS
[facility] Surgery Consult    Robin Velasco MRN# [id number]   Age: 35 year old YOB: 1985     Date of Admission:  6/10/2021    Reason for consult: Abdominal pain, left lower quadrant, diverticulitis       Requesting physician: Dr Schwab       Level of consult: Consult, follow and place orders           Impression and Plan:   Impression:   Acute diverticulitis with microperforation as a result of Campylobacter diarrhea.  Clinically improving with no signs of sepsis.      Plan:   We will continue clears and IV antibiotics.  Can advance when left lower quadrant tenderness resolved.  Will need a colonoscopy in approximately 6 weeks.  If worsens will consider repeat CT scan versus OR.           Chief Complaint:   Abdominal pain, left lower quadrant     History is obtained from the patient         History of Present Illness:   This 35 year old male who was diagnosed with Campylobacter diarrhea about a week ago.  He had no abdominal pain up under this past Wednesday when he was straining to move his bowels and developed severe left lower quadrant pain.  The pain was sharp sudden and worsen with movement.  The pain continue to worsen the patient presented to the ER last night.  Subsequent CT in the ER revealed some acute diverticulitis with localized microperforation.  He denies any nausea vomiting fevers or chills but continues to have the diarrhea.  He denies any prior episodes of diverticulitis.  There is no family history of colon cancer.  He has never had a colonoscopy.  I am now asked to see him for his diverticulitis.         Past Medical History:   No past medical history on file.          Past Surgical History:     Past Surgical History:   Procedure Laterality Date     DISCECTOMY LUMBAR POSTERIOR MICROSCOPIC ONE LEVEL  10/18/2011    Procedure:DISCECTOMY LUMBAR POSTERIOR MICROSCOPIC ONE LEVEL; LEFT L5-S1 HEMILAMINECTOMY MICRODISCECTOMY WITH METRIX II ; Surgeon:APRIL SEGURA;  Location:[facility] OR     ENT SURGERY               Social History:     Social History     Tobacco Use     Smoking status: Never Smoker   Substance Use Topics     Alcohol use: Yes     Comment: 1 month             Family History:   No family history on file.           Allergies:   No Known Allergies          Medications:     Current Facility-Administered Medications   Medication     azithromycin (ZITHROMAX) tablet 500 mg     cefTRIAXone (ROCEPHIN) 2 g vial to attach to  ml bag for ADULTS or NS 50 ml bag for PEDS     enoxaparin ANTICOAGULANT (LOVENOX) injection 40 mg     HYDROmorphone (PF) (DILAUDID) injection 0.5-1 mg     lactated ringers infusion     lidocaine (LMX4) kit     lidocaine 1 % 0.1-1 mL     melatonin tablet 1 mg     metroNIDAZOLE (FLAGYL) infusion 500 mg     naloxone (NARCAN) injection 0.2 mg    Or     naloxone (NARCAN) injection 0.4 mg    Or     naloxone (NARCAN) injection 0.2 mg    Or     naloxone (NARCAN) injection 0.4 mg     ondansetron (ZOFRAN-ODT) ODT tab 4 mg    Or     ondansetron (ZOFRAN) injection 4 mg     sodium chloride (PF) 0.9% PF flush 3 mL     sodium chloride (PF) 0.9% PF flush 3 mL             Review of Systems:   The review of systems was positive for the following findings.  None.  The remainder of the review of systems was unremarkable.          Physical Exam:   PE:  B/P: 141/90, T: 98.6, P: 86, R: 18  General: well developed, well nourished WM who appears his stated age  HEENT: NC/AT, EOMI, (-)icterus, (-)injection  Neck: Supple, No JVD  Chest: CTA  Heart: S1, S2, (-)m/r/g  Abd: Soft, left lower quadrant tenderness with guarding non distended, no masses  Ext; Warm, no edema  Psych: AAOx3  Neuro: No focal deficits         Data:   All laboratory data reviewed  Results for orders placed or performed during the hospital encounter of 06/10/21 (from the past 24 hour(s))   Potassium   Result Value Ref Range    Potassium 3.6 3.4 - 5.3 mmol/L   Basic metabolic panel   Result Value Ref Range     Sodium 136 133 - 144 mmol/L    Potassium 3.8 3.4 - 5.3 mmol/L    Chloride 106 94 - 109 mmol/L    Carbon Dioxide 25 20 - 32 mmol/L    Anion Gap 5 3 - 14 mmol/L    Glucose 88 70 - 99 mg/dL    Urea Nitrogen 10 7 - 30 mg/dL    Creatinine 1.00 0.66 - 1.25 mg/dL    GFR Estimate >90 >60 mL/min/[1.73_m2]    GFR Estimate If Black >90 >60 mL/min/[1.73_m2]    Calcium 8.6 8.5 - 10.1 mg/dL   CBC with platelets differential   Result Value Ref Range    WBC 11.3 (H) 4.0 - 11.0 10e9/L    RBC Count 4.52 4.4 - 5.9 10e12/L    Hemoglobin 14.0 13.3 - 17.7 g/dL    Hematocrit 41.1 40.0 - 53.0 %    MCV 91 78 - 100 fl    MCH 31.0 26.5 - 33.0 pg    MCHC 34.1 31.5 - 36.5 g/dL    RDW 13.2 10.0 - 15.0 %    Platelet Count 216 150 - 450 10e9/L    Diff Method Automated Method     % Neutrophils 73.9 %    % Lymphocytes 13.5 %    % Monocytes 10.3 %    % Eosinophils 1.3 %    % Basophils 0.3 %    % Immature Granulocytes 0.7 %    Nucleated RBCs 0 0 /100    Absolute Neutrophil 8.4 (H) 1.6 - 8.3 10e9/L    Absolute Lymphocytes 1.5 0.8 - 5.3 10e9/L    Absolute Monocytes 1.2 0.0 - 1.3 10e9/L    Absolute Eosinophils 0.2 0.0 - 0.7 10e9/L    Absolute Basophils 0.0 0.0 - 0.2 10e9/L    Abs Immature Granulocytes 0.1 0 - 0.4 10e9/L    Absolute Nucleated RBC 0.0        CT -   CT ABDOMEN PELVIS W CONTRAST 6/10/2021 2:07 PM     CLINICAL HISTORY: Left lower quadrant pain     TECHNIQUE: CT scan of the abdomen and pelvis was performed following  injection of IV contrast. Multiplanar reformats were obtained. Dose  reduction techniques were used.  CONTRAST: 100 mL Isovue 370      COMPARISON: None.     FINDINGS:   LOWER CHEST: Normal.     HEPATOBILIARY: Normal.     PANCREAS: Normal.     SPLEEN: Normal.     ADRENAL GLANDS: Normal.     KIDNEYS/BLADDER: Normal.     BOWEL: Mild sigmoid diverticulosis. Wall thickening and inflammatory  changes surrounding the diverticular segment of the sigmoid colon. A  few punctate foci of extraluminal air extend superior to the  inflamed  sigmoid colon (series 3, image 172). Findings are compatible with  acute sigmoid diverticulitis with perforation. No abscess. No small  bowel or colonic obstruction. Normal appendix. Small hiatal hernia.     PELVIC ORGANS: Normal.     ADDITIONAL FINDINGS: No lymphadenopathy in the abdomen and pelvis. A  few prominent right mesenteric lymph nodes are likely reactive. No  free fluid.     MUSCULOSKELETAL: Normal.                                                                      IMPRESSION:   1.  Acute sigmoid diverticulitis with perforation and few small foci  of adjacent free air. No abscess.     NERY DAMICO MD      All imaging studies reviewed by me.     Huang David MD, FACS

## 2021-06-11 NOTE — PROGRESS NOTES
Antimicrobial Stewardship Team Note    Antimicrobial Stewardship Program - A joint venture between [facility] and  Physicians to optimize antibiotic management.  NOT a formal consult - Restricted Antimicrobial Review     Patient: Robin Velasco  MRN: [id number]  Allergies: Patient has no known allergies.    Brief Summary: Robin Velasco is a 35 year old male with an unremarkable past medical history except for back surgery (10/2011) 2/2 L S1 radiculopathy and L L5-S1 disc herniation who was admitted  on 6/10/2021 with non-bloody diarrhea and abdominal pain.    History of Present Illness: Patient reported last Thursday (6/3) he developed some abdominal pain and had episodes of diarrhea. His symptoms slowly improved over the next few days with Imodium. He presented to the ED on account of diarrhea recurrence and intensification. He endorsed moderate left lower quadrant abdominal pain that was not radiating to his back. He denied any associated nausea or vomiting. He did not report a fever, but endorsed having chills. His wife had diarrhea episodes a day or two before the onset of his symptoms, but hers cleared quickly. He reported no recent travel. On presentation, he was afebrile (Tmax 100), tachycardic (), normotensive, and on room air. Initial WBC count of 18.2 with left shift at urgent care clinic earlier in the day. Abdominal XR was significant for a small amount of gas in a non-specific pattern. CT a/p w/ contrast showed mild sigmoid diverticulosis with wall thickening and inflammatory changes surrounding the diverticular segment of the sigmoid colon; a few punctate foci of extraluminal air extend superior to the inflamed sigmoid colon compatible with acute sigmoid diverticulitis with perforation. General surgery consult is pending. Patient was started empirically on Zosyn. Enteric panel was positive for Campylobacter.            Active Anti-infective Medications   (From admission,  onward)                 Start     Stop    06/10/21 1510  piperacillin-tazobactam  3.375 g,   Intravenous,   100 mL/hr,   EVERY 6 HOURS     Intra-Abdominal Infection        --                  Assessment: Acute sigmoid diverticulitis c/b perforation in the setting of recent Campylobacter infection  Patient's fever curve has trended down and tachycardia has resolved. His leukocytosis also decreased (11.3 today). Pending surgery input, patient does not have a past medical history significant for multidrug resistant organisms, including Pseudomonas aeruginosa, nor does he have comorbidities that may increase his risk of isolating these organisms on culture. Pseudomonas aeruginosa is not a known colonizer of the GI tract in community-dwellers. Recommend transitioning from Zosyn to ceftriaxone plus metronidazole.     Campylobacter infection usually starts in the jejunum and ileum and can progress distally to affect the cecum and colon. This is usually a self-limiting disease that does not require antimicrobial treatment. A meta-analysis of 11 small randomized controlled trials concluded that antimicrobial therapy reduced duration of intestinal symptoms by only 1.3 days with a non-significant trend toward greater benefit for patients treated within the first three days of illness. One hypothesis is the inflammation from the Campylobacter instigated the diverticulitis and perforation. Given that patient's onset of symptoms occurred over a week ago, correlating with wife's diarrheal illness, agree with not initiating azithromycin to target Campylobacter as the benefit gained from targeted treatment appears minimal and would agree in broadly covering other known enteric GI organisms.     Recommendations:  Transition from Zosyn to ceftriaxone 2 g IV every 24 hours plus metronidazole 500 mg IV/PO every 8 hours  Agree with not starting azithromycin to target Campylobacter - benefit gained from targeted treatment appears minimal  at this point    Discussed with ID Staff NIRMAL Herring, PharmD, BCIDP  Pager: [phone]    Vital Signs/Clinical Features:  Vitals         06/09 0700  -  06/10 0659 06/10 0700  -  06/11 0659 06/11 0700  -  06/11 1159   Most Recent    Temp ( F)   98 -  100       98 (36.7)    Pulse   88 -  122       88    Resp   16 -  20       18    BP   119/81 -  155/106       119/81    SpO2 (%)   93 -  100       98            Labs  Estimated Creatinine Clearance: 137.8 mL/min (based on SCr of 1 mg/dL).  Recent Labs   Lab Test 06/10/21  1235 06/11/21  0652   CR 0.94 1.00       Recent Labs   Lab Test 06/11/21  0652   WBC 11.3*   ANEU 8.4*   ALYM 1.5   SIMONE 1.2   AEOS 0.2   HGB 14.0   HCT 41.1   MCV 91          Recent Labs   Lab Test 06/10/21  1235   BILITOTAL 1.0   ALKPHOS 74   ALBUMIN 3.7   AST 16   ALT 33       Recent Labs   Lab Test 06/10/21  1235   LACT 1.1             Culture Results:  7-Day Micro Results       Procedure Component Value Units Date/Time    Enteric Bacteria and Virus Panel by ROSALINE Stool []  (Abnormal) Collected: 06/10/21 1408    Order Status: Completed Lab Status: Final result Updated: 06/11/21 0039    Specimen: Feces      Campylobacter group by ROSALINE Detected, Abnormal Result     Comment: Critical Value/Significant Value called to and read back by  Alison Walton RN @ 0038 6/11/21 TM.          Salmonella species by ROSALINE Not Detected     Shigella species by ROSALINE Not Detected     Vibrio group by ROSALINE Not Detected     Rotavirus A by ROSALINE Not Detected     Shiga toxin 1 gene by ROSALINE Not Detected     Shiga toxin 2 gene by ROSALINE Not Detected     Norovirus I and II by ROSALINE Not Detected     Yersinia enterocolitica by ROSALINE Not Detected     Enteric pathogen comment --     Testing performed by multiplexed, qualitative PCR using the Nanosphere Cucinialeigene Enteric   Pathogens Nucleic Acid Test. Results should not be used as the sole basis for diagnosis,   treatment, or other patient management  decisions.       Comment: Positive results do not rule out co-infection with other organisms that are   not detected by this test, and may not be the sole or definitive cause of   patient illness.   Negative results in the setting of clinical illness compatible with   gastroenteritis may be due to infection by pathogens that are not detected by   this test or non-infectious causes such as ulcerative colitis, irritable bowel   syndrome, or Crohn's disease.   Note: Shiga toxin producing E. coli (STEC) typically harbor one or both genes   that encode for Shiga toxins 1 and 2.                 Recent Labs   Lab Test 06/10/21  1408   URINEPH 9.0*   NITRITE Negative   LEUKEST Negative                         Imaging: Ct Abdomen Pelvis W Contrast    Result Date: 6/10/2021  CT ABDOMEN PELVIS W CONTRAST 6/10/2021 2:07 PM CLINICAL HISTORY: Left lower quadrant pain TECHNIQUE: CT scan of the abdomen and pelvis was performed following injection of IV contrast. Multiplanar reformats were obtained. Dose reduction techniques were used. CONTRAST: 100 mL Isovue 370 COMPARISON: None. FINDINGS: LOWER CHEST: Normal. HEPATOBILIARY: Normal. PANCREAS: Normal. SPLEEN: Normal. ADRENAL GLANDS: Normal. KIDNEYS/BLADDER: Normal. BOWEL: Mild sigmoid diverticulosis. Wall thickening and inflammatory changes surrounding the diverticular segment of the sigmoid colon. A few punctate foci of extraluminal air extend superior to the inflamed sigmoid colon (series 3, image 172). Findings are compatible with acute sigmoid diverticulitis with perforation. No abscess. No small bowel or colonic obstruction. Normal appendix. Small hiatal hernia. PELVIC ORGANS: Normal. ADDITIONAL FINDINGS: No lymphadenopathy in the abdomen and pelvis. A few prominent right mesenteric lymph nodes are likely reactive. No free fluid. MUSCULOSKELETAL: Normal.     IMPRESSION: 1.  Acute sigmoid diverticulitis with perforation and few small foci of adjacent free air. No abscess. NERY  MAISHA DAMICO MD

## 2021-06-11 NOTE — ED NOTES
ED Nursing criteria listed below was addressed during verbal handoff:     Abnormal vitals: N/A  Abnormal results: Yes  Med Reconciliation completed: Yes  Meds given in ED: Yes  Any Overdue Meds: N/A  Core Measures: Yes  Isolation: N/A  Special needs: No  Skin assessment: Yes, no areas of concern.    Observation Patient  Education provided: N/A

## 2021-06-11 NOTE — PROGRESS NOTES
[facility]    Medicine Progress Note - Hospitalist Service       Date of Admission:  6/10/2021  Assessment & Plan       Generally healthy 35-year-old man admitted due to concern about acute diverticulitis with microperforation.  Recent Campylobacter enteritis is suspected with subsequent evolution to diverticulitis and perforation 2 days ago.  He continues to have symptoms of enteritis and could have enteroinvasive Campylobacter infection and has had systemic inflammatory response syndrome including leukocytosis and tachycardia.  He generally appears to be improving.    Principal Problem:    Diverticulitis of large intestine with perforation without bleeding  Active Problems:    Campylobacter diarrhea    SIRS (systemic inflammatory response syndrome) (H)-tachycardia, leukocytosis    Hypopotassemia    Will treat Campylobacter with 3-day course of Zithromax because of persistent symptoms and concern for possible enteroinvasive infection  Switch other antibiotic therapy to ceftriaxone and metronidazole for treatment of acute diverticulitis  Continue IV fluids but switch to lactated Ringer's  General surgery consulted because of suspected microperforation  Start clear liquid diet  Use IV narcotics as needed for severe abdominal pain       Diet: NPO for Medical/Clinical Reasons Except for: Ice Chips, Meds    DVT Prophylaxis: Enoxaparin (Lovenox) SQ  [place] Catheter: not present  Code Status: Full Code           Disposition Plan   Expected discharge: 2 - 3 days, recommended to prior living arrangement once antibiotic plan established and Tolerating adequate oral intake with symptoms controlled with oral medications.  Entered: Pedro Schwab MD 06/11/2021, 1:46 PM       The patient's care was discussed with the Bedside Nurse and Patient.    Pedro Schwab MD  Hospitalist Service  [facility]  Contact information available via [facility]  [place]/y    ______________________________________________________________________    Interval History   He is starting to feel somewhat better today.  His left lower abdominal pain is not as severe although he has had episodes today where he has requested doses of IV Dilaudid because of more severe abdominal pain.  He is hungry and somewhat thirsty.  He denies any nausea.  He continues to have frequent watery diarrhea.  He says his diarrhea began 7 days ago after he had consumed some home-cooked brisket but also potato salad.  His wife had similar diarrheal symptoms for about 2 days.  He did not have any abdominal pain until 2 days ago when he was straining to have diarrhea and had abrupt onset of pain in his left lower abdomen.  He has never had diverticulitis in the past.  He has not seen any blood in his stools.  Maximum temperature was 100.  Tachycardia is improving as his blood pressure.  Oxygenation is normal.  He is ambulating independently.    Data reviewed today: I reviewed all medications, new labs and imaging results over the last 24 hours. I personally reviewed no images or EKG's today.    Physical Exam   Vital Signs: Temp: 98  F (36.7  C) Temp src: Oral BP: 119/81 Pulse: 88   Resp: 18 SpO2: 98 % O2 Device: None (Room air)    Weight: 254 lbs 0 oz   Wt Readings from Last 4 Encounters:   06/10/21 115.2 kg (254 lb)   10/19/11 100.8 kg (222 lb 3.6 oz)   10/10/11 106.6 kg (235 lb)     General Appearance: No acute distress resting in bed  Respiratory: Normal respiratory effort, clear lungs  Cardiovascular: Regular rate and rhythm, good radial pulse, normal capillary refill  GI: Normal bowel sounds, nondistended abdomen, soft, left lower quad abdominal tenderness present without peritoneal signs  Skin: No rash     Data   Recent Labs   Lab 06/11/21  0652 06/11/21  0030 06/10/21  1235   WBC 11.3*  --   --    HGB 14.0  --   --    MCV 91  --   --      --   --      --  133   POTASSIUM 3.8 3.6  3.4   CHLORIDE 106  --  99   CO2 25  --  27   BUN 10  --  8   CR 1.00  --  0.94   ANIONGAP 5  --  7   KELLEN 8.6  --  9.6   GLC 88  --  103*   ALBUMIN  --   --  3.7   PROTTOTAL  --   --  7.9   BILITOTAL  --   --  1.0   ALKPHOS  --   --  74   ALT  --   --  33   AST  --   --  16     WBC on the day of admission was 18.2  Stool testing for enteric pathogens was positive for Campylobacter    Recent Results (from the past 24 hour(s))   CT Abdomen Pelvis w Contrast    Narrative    CT ABDOMEN PELVIS W CONTRAST 6/10/2021 2:07 PM    CLINICAL HISTORY: Left lower quadrant pain    TECHNIQUE: CT scan of the abdomen and pelvis was performed following  injection of IV contrast. Multiplanar reformats were obtained. Dose  reduction techniques were used.  CONTRAST: 100 mL Isovue 370     COMPARISON: None.    FINDINGS:   LOWER CHEST: Normal.    HEPATOBILIARY: Normal.    PANCREAS: Normal.    SPLEEN: Normal.    ADRENAL GLANDS: Normal.    KIDNEYS/BLADDER: Normal.    BOWEL: Mild sigmoid diverticulosis. Wall thickening and inflammatory  changes surrounding the diverticular segment of the sigmoid colon. A  few punctate foci of extraluminal air extend superior to the inflamed  sigmoid colon (series 3, image 172). Findings are compatible with  acute sigmoid diverticulitis with perforation. No abscess. No small  bowel or colonic obstruction. Normal appendix. Small hiatal hernia.    PELVIC ORGANS: Normal.    ADDITIONAL FINDINGS: No lymphadenopathy in the abdomen and pelvis. A  few prominent right mesenteric lymph nodes are likely reactive. No  free fluid.    MUSCULOSKELETAL: Normal.      Impression    IMPRESSION:   1.  Acute sigmoid diverticulitis with perforation and few small foci  of adjacent free air. No abscess.    NERY DAMICO MD     Medications     sodium chloride 125 mL/hr at 06/11/21 0818       enoxaparin ANTICOAGULANT  40 mg Subcutaneous Q24H     piperacillin-tazobactam  3.375 g Intravenous Q6H     sodium chloride (PF)  3 mL Intracatheter  Q8H

## 2021-06-11 NOTE — PLAN OF CARE
S-(situation): Patient arrives to room 268 via cart from ES    B-(background): Diverticulitis of large intestine with perforation w/out bleeding    A-(assessment): Patient is alert and oriented x4, lungs CTA, heart rate tachycardic, rhythm regular, hypoactive bowel sounds in all 4 quadrants. Patient rated his pain at 2/10 in his LLQ. Patient denied having any nausea. The patient is independent in his room for all transfers, ambulation, and ADLS. The patients gait is steady and strong.     R-(recommendations): Orders reviewed with Patient and Wife Analy. Will monitor patient per MD orders.     Inpatient nursing criteria listed below were met:    Health care directives status obtained and documented: Yes  SCD's Documented: N/A  Skin issues/needs documented:NA  Isolation addressed and Signage used: NA  Fall Prevention: Care plan updated Yes Education given and documented Yes  Care Plan initiated and Co-Morbidities added: Yes  Education Assessment documented:Yes  Admission Education Documented: Yes  If present CAUTI/CLABI Education done: NA  New medication patient education completed and documented (Possible Side Effects of Common Medications handout): Yes  Allergies Reviewed: Yes  Admission Medication Reconciliation completed: Yes  Home medications if not able to send immediately home with family stored here: NA  Reminder note placed in discharge instructions regarding home meds: NA  Individualized care needs/preferences addressed and charted: Yes

## 2021-06-11 NOTE — PLAN OF CARE
Vital signs:  Temp: 98.6  F (37  C) Temp src: Oral BP: (!) 141/90 Pulse: 86   Resp: 18 SpO2: 100 % O2 Device: None (Room air)    Patient is A&Ox4. IV dilaudid given x2 for RLQ pain. Abdomen remains tender to touch. Active bowel sounds. Passing flatus. Loose stools improving. Diet switched to clears and tolerating well. IV Flagyl, IV Rocephin and oral Zithromax added and given. Up independently in room. No complaints at this time. Will continue to monitor.

## 2021-06-12 LAB
ANION GAP SERPL CALCULATED.3IONS-SCNC: 3 MMOL/L (ref 3–14)
BUN SERPL-MCNC: 7 MG/DL (ref 7–30)
CALCIUM SERPL-MCNC: 8.6 MG/DL (ref 8.5–10.1)
CHLORIDE SERPL-SCNC: 106 MMOL/L (ref 94–109)
CO2 SERPL-SCNC: 27 MMOL/L (ref 20–32)
CREAT SERPL-MCNC: 0.9 MG/DL (ref 0.66–1.25)
GFR SERPL CREATININE-BSD FRML MDRD: >90 ML/MIN/{1.73_M2}
GLUCOSE SERPL-MCNC: 89 MG/DL (ref 70–99)
POTASSIUM SERPL-SCNC: 3.9 MMOL/L (ref 3.4–5.3)
SODIUM SERPL-SCNC: 136 MMOL/L (ref 133–144)
WBC # BLD AUTO: 7.6 10E9/L (ref 4–11)

## 2021-06-12 PROCEDURE — 99232 SBSQ HOSP IP/OBS MODERATE 35: CPT | Performed by: SURGERY

## 2021-06-12 PROCEDURE — 250N000011 HC RX IP 250 OP 636: Performed by: INTERNAL MEDICINE

## 2021-06-12 PROCEDURE — 258N000003 HC RX IP 258 OP 636: Performed by: PEDIATRICS

## 2021-06-12 PROCEDURE — 99232 SBSQ HOSP IP/OBS MODERATE 35: CPT | Performed by: PEDIATRICS

## 2021-06-12 PROCEDURE — 85048 AUTOMATED LEUKOCYTE COUNT: CPT | Performed by: PEDIATRICS

## 2021-06-12 PROCEDURE — 250N000011 HC RX IP 250 OP 636: Performed by: PEDIATRICS

## 2021-06-12 PROCEDURE — 120N000001 HC R&B MED SURG/OB

## 2021-06-12 PROCEDURE — 36415 COLL VENOUS BLD VENIPUNCTURE: CPT | Performed by: PEDIATRICS

## 2021-06-12 PROCEDURE — 80048 BASIC METABOLIC PNL TOTAL CA: CPT | Performed by: PEDIATRICS

## 2021-06-12 PROCEDURE — 250N000013 HC RX MED GY IP 250 OP 250 PS 637: Performed by: PEDIATRICS

## 2021-06-12 RX ORDER — METRONIDAZOLE 500 MG/1
500 TABLET ORAL 3 TIMES DAILY
Status: DISCONTINUED | OUTPATIENT
Start: 2021-06-12 | End: 2021-06-13 | Stop reason: HOSPADM

## 2021-06-12 RX ORDER — LORAZEPAM 1 MG/1
1 TABLET ORAL EVERY 4 HOURS PRN
Status: DISCONTINUED | OUTPATIENT
Start: 2021-06-12 | End: 2021-06-13 | Stop reason: HOSPADM

## 2021-06-12 RX ORDER — ACETAMINOPHEN 500 MG
1000 TABLET ORAL EVERY 6 HOURS PRN
Status: DISCONTINUED | OUTPATIENT
Start: 2021-06-12 | End: 2021-06-13 | Stop reason: HOSPADM

## 2021-06-12 RX ORDER — OXYCODONE HYDROCHLORIDE 5 MG/1
5 TABLET ORAL EVERY 4 HOURS PRN
Status: DISCONTINUED | OUTPATIENT
Start: 2021-06-12 | End: 2021-06-13 | Stop reason: HOSPADM

## 2021-06-12 RX ORDER — OXYCODONE HYDROCHLORIDE 5 MG/1
5 TABLET ORAL EVERY 4 HOURS PRN
Qty: 10 TABLET | Refills: 0 | Status: SHIPPED | OUTPATIENT
Start: 2021-06-12

## 2021-06-12 RX ORDER — CEFDINIR 300 MG/1
600 CAPSULE ORAL DAILY
Qty: 14 CAPSULE | Refills: 0 | Status: SHIPPED | OUTPATIENT
Start: 2021-06-13 | End: 2021-06-20

## 2021-06-12 RX ORDER — METRONIDAZOLE 500 MG/1
500 TABLET ORAL 3 TIMES DAILY
Qty: 21 TABLET | Refills: 0 | Status: SHIPPED | OUTPATIENT
Start: 2021-06-13 | End: 2021-06-20

## 2021-06-12 RX ORDER — ACETAMINOPHEN 500 MG
1000 TABLET ORAL EVERY 6 HOURS PRN
COMMUNITY
Start: 2021-06-12

## 2021-06-12 RX ADMIN — METRONIDAZOLE 500 MG: 500 TABLET ORAL at 14:37

## 2021-06-12 RX ADMIN — SODIUM CHLORIDE, POTASSIUM CHLORIDE, SODIUM LACTATE AND CALCIUM CHLORIDE: 600; 310; 30; 20 INJECTION, SOLUTION INTRAVENOUS at 08:20

## 2021-06-12 RX ADMIN — OXYCODONE HYDROCHLORIDE 5 MG: 5 TABLET ORAL at 13:37

## 2021-06-12 RX ADMIN — METRONIDAZOLE 500 MG: 500 TABLET ORAL at 21:01

## 2021-06-12 RX ADMIN — ENOXAPARIN SODIUM 40 MG: 40 INJECTION SUBCUTANEOUS at 21:01

## 2021-06-12 RX ADMIN — AZITHROMYCIN MONOHYDRATE 500 MG: 250 TABLET ORAL at 09:28

## 2021-06-12 RX ADMIN — CEFTRIAXONE SODIUM 2 G: 2 INJECTION, POWDER, FOR SOLUTION INTRAMUSCULAR; INTRAVENOUS at 14:37

## 2021-06-12 RX ADMIN — HYDROMORPHONE HYDROCHLORIDE 0.5 MG: 1 INJECTION, SOLUTION INTRAMUSCULAR; INTRAVENOUS; SUBCUTANEOUS at 01:28

## 2021-06-12 RX ADMIN — HYDROMORPHONE HYDROCHLORIDE 0.5 MG: 1 INJECTION, SOLUTION INTRAMUSCULAR; INTRAVENOUS; SUBCUTANEOUS at 08:20

## 2021-06-12 RX ADMIN — LORAZEPAM 1 MG: 1 TABLET ORAL at 22:49

## 2021-06-12 RX ADMIN — OXYCODONE HYDROCHLORIDE 5 MG: 5 TABLET ORAL at 18:50

## 2021-06-12 RX ADMIN — METRONIDAZOLE 500 MG: 500 INJECTION, SOLUTION INTRAVENOUS at 05:46

## 2021-06-12 ASSESSMENT — ACTIVITIES OF DAILY LIVING (ADL)
ADLS_ACUITY_SCORE: 11

## 2021-06-12 NOTE — PROGRESS NOTES
[facility]    Medicine Progress Note - Hospitalist Service       Date of Admission:  6/10/2021  Assessment & Plan             Generally healthy 35-year-old man admitted due to concern about acute diverticulitis with microperforation.  Recent Campylobacter enteritis is suspected with subsequent evolution to diverticulitis and perforation 2 days ago.  He continues to have symptoms of enteritis and could have enteroinvasive Campylobacter infection and has had systemic inflammatory response syndrome including leukocytosis and tachycardia.  He generally appears to be improving.    Principal Problem:    Diverticulitis of large intestine with perforation without bleeding  Active Problems:    Campylobacter diarrhea    SIRS (systemic inflammatory response syndrome) (H)-tachycardia, leukocytosis    Hypopotassemia      Complete 3-day course of Zithromax to treat Campylobacter enteritis, today is day 2  Continue ceftriaxone and switch IV to oral metronidazole for treatment of acute diverticulitis  Discontinue IV fluids  Care plan discussed with general surgeon today who expressed agreement  Advance to regular diet as tolerated  Switch IV to oral narcotics as needed for severe abdominal pain, use acetaminophen as needed for mild pain       Diet: Clear Liquid Diet    DVT Prophylaxis: Enoxaparin (Lovenox) SQ  [place] Catheter: not present  Code Status: Full Code           Disposition Plan   Expected discharge: Tomorrow, recommended to prior living arrangement once Tolerating adequate oral intake and pain is adequately controlled with oral analgesics.  Entered: Pdero Schwab MD 06/12/2021, 1:07 PM       The patient's care was discussed with the Care Coordinator/, Patient and Patient's Family.    Pedro Schwab MD  Hospitalist Service  [facility]  Contact information available via [facility]  [place]/y    ______________________________________________________________________    Interval History   He is feeling better today.  He has less abdominal pain although did need 2 doses of IV Dilaudid overnight last night.  He denies nausea.  Appetite is improving.  He had some transient nausea last evening after increasing liquid intake but that subsided.  Diarrhea is slowing and starting to have slightly more texture.  He did not have any diarrhea overnight last night.  He has no new complaints.  He remains afebrile and hemodynamically stable.  Oxygenation is normal.    Data reviewed today: I reviewed all medications, new labs and imaging results over the last 24 hours. I personally reviewed no images or EKG's today.    Physical Exam   Vital Signs: Temp: 96.8  F (36  C) Temp src: Oral BP: (!) 149/91 Pulse: 85   Resp: 18 SpO2: 98 % O2 Device: None (Room air)    Weight: 254 lbs 0 oz  General Appearance: Appears comfortable resting in bed  GI: Normal bowel sounds, nondistended abdomen, soft, mild left lower quadrant abdominal tenderness without peritoneal signs    Data   Recent Labs   Lab 06/12/21  0627 06/11/21  0652 06/11/21  0030 06/10/21  1235   WBC 7.6 11.3*  --   --    HGB  --  14.0  --   --    MCV  --  91  --   --    PLT  --  216  --   --     136  --  133   POTASSIUM 3.9 3.8 3.6 3.4   CHLORIDE 106 106  --  99   CO2 27 25  --  27   BUN 7 10  --  8   CR 0.90 1.00  --  0.94   ANIONGAP 3 5  --  7   KELLEN 8.6 8.6  --  9.6   GLC 89 88  --  103*   ALBUMIN  --   --   --  3.7   PROTTOTAL  --   --   --  7.9   BILITOTAL  --   --   --  1.0   ALKPHOS  --   --   --  74   ALT  --   --   --  33   AST  --   --   --  16     Medications     lactated ringers 75 mL/hr at 06/12/21 0820       azithromycin  500 mg Oral Daily     cefTRIAXone  2 g Intravenous Q24H     enoxaparin ANTICOAGULANT  40 mg Subcutaneous Q24H     metroNIDAZOLE  500 mg Intravenous Q8H     sodium chloride (PF)  3 mL Intracatheter Q8H

## 2021-06-12 NOTE — PROGRESS NOTES
Situation: Shift note     Background: Diverticulitis of the large intestine.     Assessment: Pt is A &O x4. Does c/o pain in his RUQ and LUQ abdomen. PRN dilaidid given x2 this shift. Pt did have some feeling of fullness and nausea with water intake. Writer advised patient to rest bowels and switch to ice chips, which improved discomfort. Later patient tried beef broth and was able to tolerate this with no complaints of discomfort. LS CTA. Pt up independently in room and had no other complaints.     Recommendation: Will continue to monitor.

## 2021-06-12 NOTE — PLAN OF CARE
Problem: Pain (Intestinal Obstruction)  Goal: Acceptable Pain Control  Intervention: Monitor and Manage Pain  Recent Flowsheet Documentation  Taken 6/12/2021 0820 by Matt Agee, RN  Pain Management Interventions: medication (see MAR)   Patient doing well. IV dilaudid x1, and oxycodone started this afternoon. Tolerating clear liquid diet with plan to advance to regular for supper. Up in halls ambulating.  VSS. Flagyl changed to po. No new complaints.  Matt Agee, RN

## 2021-06-12 NOTE — PLAN OF CARE
"Vital signs:  Temp: 98.1  F (36.7  C) Temp src: Oral BP: (!) 143/82 Pulse: 88   Resp: 18 SpO2: 99 % O2 Device: None (Room air)   Height: 188 cm (6' 2\")   Patient is A&Ox4. PRN Oxycodone given prior shift for pain with relief. Regular diet started for dinner and tolerated well with no nausea or increase in pain. Abdomen remains slightly tender. Active bowel sounds, passing gas and having stools. Independent in room. Lungs are clear. Skin intact. IV SL. No complaints at this time. Will continue to monitor.      "

## 2021-06-12 NOTE — PROGRESS NOTES
"General Surgery    S: abdominal pain much improved, still feels some in LLQ. +BM, flatus. Some mild nausea with clears initially, seems better now, thinks might have gone too fast.    O:  Vital signs:  Temp: 96.8  F (36  C) Temp src: Oral BP: (!) 149/91 Pulse: 85   Resp: 18 SpO2: 98 % O2 Device: None (Room air)   Height: 188 cm (6' 2\") Weight: 115.2 kg (254 lb)  Estimated body mass index is 32.61 kg/m  as calculated from the following:    Height as of this encounter: 1.88 m (6' 2\").    Weight as of this encounter: 115.2 kg (254 lb).      Gen: AAOx3, NAD  Heart: RRR  Lungs: CTA  Abd: Soft, non-distended. Tender in LLQ with palpation, not peritoneal no rebound or guarding. Bowel sounds present. obese    Labs/Imaging  Results for orders placed or performed during the hospital encounter of 06/10/21 (from the past 24 hour(s))   Surgery General IP Consult: Patient to be seen: Routine within 24 hrs; diverticulitis & Camplyobacter infection with suspected microperforation; Consultant may enter orders: Yes; Requesting provider? Hospitalist (if different from attending physic...    Narrative    Huang David MD     6/11/2021  4:09 PM  [facility] Surgery Consult    Robin Velasco MRN# [id number]   Age: 35 year old YOB: 1985     Date of Admission:  6/10/2021    Reason for consult: Abdominal pain, left lower quadrant,   diverticulitis       Requesting physician: Dr Schwab       Level of consult: Consult, follow and place orders           Impression and Plan:   Impression:   Acute diverticulitis with microperforation as a result of   Campylobacter diarrhea.  Clinically improving with no signs of   sepsis.      Plan:   We will continue clears and IV antibiotics.  Can advance when   left lower quadrant tenderness resolved.  Will need a colonoscopy   in approximately 6 weeks.  If worsens will consider repeat CT   scan versus OR.           Chief Complaint:   Abdominal pain, left lower quadrant     History " is obtained from the patient         History of Present Illness:   This 35 year old male who was diagnosed with Campylobacter   diarrhea about a week ago.  He had no abdominal pain up under   this past Wednesday when he was straining to move his bowels and   developed severe left lower quadrant pain.  The pain was sharp   sudden and worsen with movement.  The pain continue to worsen the   patient presented to the ER last night.  Subsequent CT in the ER   revealed some acute diverticulitis with localized   microperforation.  He denies any nausea vomiting fevers or chills   but continues to have the diarrhea.  He denies any prior episodes   of diverticulitis.  There is no family history of colon cancer.    He has never had a colonoscopy.  I am now asked to see him for   his diverticulitis.         Past Medical History:   No past medical history on file.          Past Surgical History:     Past Surgical History:   Procedure Laterality Date     DISCECTOMY LUMBAR POSTERIOR MICROSCOPIC ONE LEVEL  10/18/2011    Procedure:DISCECTOMY LUMBAR POSTERIOR MICROSCOPIC ONE LEVEL;   LEFT L5-S1 HEMILAMINECTOMY MICRODISCECTOMY WITH METRIX II ;   Surgeon:APRIL SEGURA; Location: OR     ENT SURGERY               Social History:     Social History     Tobacco Use     Smoking status: Never Smoker   Substance Use Topics     Alcohol use: Yes     Comment: 1 month             Family History:   No family history on file.           Allergies:   No Known Allergies          Medications:     Current Facility-Administered Medications   Medication     azithromycin (ZITHROMAX) tablet 500 mg     cefTRIAXone (ROCEPHIN) 2 g vial to attach to  ml bag for   ADULTS or NS 50 ml bag for PEDS     enoxaparin ANTICOAGULANT (LOVENOX) injection 40 mg     HYDROmorphone (PF) (DILAUDID) injection 0.5-1 mg     lactated ringers infusion     lidocaine (LMX4) kit     lidocaine 1 % 0.1-1 mL     melatonin tablet 1 mg     metroNIDAZOLE (FLAGYL) infusion  500 mg     naloxone (NARCAN) injection 0.2 mg    Or     naloxone (NARCAN) injection 0.4 mg    Or     naloxone (NARCAN) injection 0.2 mg    Or     naloxone (NARCAN) injection 0.4 mg     ondansetron (ZOFRAN-ODT) ODT tab 4 mg    Or     ondansetron (ZOFRAN) injection 4 mg     sodium chloride (PF) 0.9% PF flush 3 mL     sodium chloride (PF) 0.9% PF flush 3 mL             Review of Systems:   The review of systems was positive for the following findings.    None.  The remainder of the review of systems was unremarkable.          Physical Exam:   PE:  B/P: 141/90, T: 98.6, P: 86, R: 18  General: well developed, well nourished WM who appears his stated   age  HEENT: NC/AT, EOMI, (-)icterus, (-)injection  Neck: Supple, No JVD  Chest: CTA  Heart: S1, S2, (-)m/r/g  Abd: Soft, left lower quadrant tenderness with guarding non   distended, no masses  Ext; Warm, no edema  Psych: AAOx3  Neuro: No focal deficits         Data:   All laboratory data reviewed  Results for orders placed or performed during the hospital   encounter of 06/10/21 (from the past 24 hour(s))   Potassium   Result Value Ref Range    Potassium 3.6 3.4 - 5.3 mmol/L   Basic metabolic panel   Result Value Ref Range    Sodium 136 133 - 144 mmol/L    Potassium 3.8 3.4 - 5.3 mmol/L    Chloride 106 94 - 109 mmol/L    Carbon Dioxide 25 20 - 32 mmol/L    Anion Gap 5 3 - 14 mmol/L    Glucose 88 70 - 99 mg/dL    Urea Nitrogen 10 7 - 30 mg/dL    Creatinine 1.00 0.66 - 1.25 mg/dL    GFR Estimate >90 >60 mL/min/[1.73_m2]    GFR Estimate If Black >90 >60 mL/min/[1.73_m2]    Calcium 8.6 8.5 - 10.1 mg/dL   CBC with platelets differential   Result Value Ref Range    WBC 11.3 (H) 4.0 - 11.0 10e9/L    RBC Count 4.52 4.4 - 5.9 10e12/L    Hemoglobin 14.0 13.3 - 17.7 g/dL    Hematocrit 41.1 40.0 - 53.0 %    MCV 91 78 - 100 fl    MCH 31.0 26.5 - 33.0 pg    MCHC 34.1 31.5 - 36.5 g/dL    RDW 13.2 10.0 - 15.0 %    Platelet Count 216 150 - 450 10e9/L    Diff Method Automated Method     %  Neutrophils 73.9 %    % Lymphocytes 13.5 %    % Monocytes 10.3 %    % Eosinophils 1.3 %    % Basophils 0.3 %    % Immature Granulocytes 0.7 %    Nucleated RBCs 0 0 /100    Absolute Neutrophil 8.4 (H) 1.6 - 8.3 10e9/L    Absolute Lymphocytes 1.5 0.8 - 5.3 10e9/L    Absolute Monocytes 1.2 0.0 - 1.3 10e9/L    Absolute Eosinophils 0.2 0.0 - 0.7 10e9/L    Absolute Basophils 0.0 0.0 - 0.2 10e9/L    Abs Immature Granulocytes 0.1 0 - 0.4 10e9/L    Absolute Nucleated RBC 0.0        CT -   CT ABDOMEN PELVIS W CONTRAST 6/10/2021 2:07 PM     CLINICAL HISTORY: Left lower quadrant pain     TECHNIQUE: CT scan of the abdomen and pelvis was performed   following  injection of IV contrast. Multiplanar reformats were obtained.   Dose  reduction techniques were used.  CONTRAST: 100 mL Isovue 370      COMPARISON: None.     FINDINGS:   LOWER CHEST: Normal.     HEPATOBILIARY: Normal.     PANCREAS: Normal.     SPLEEN: Normal.     ADRENAL GLANDS: Normal.     KIDNEYS/BLADDER: Normal.     BOWEL: Mild sigmoid diverticulosis. Wall thickening and   inflammatory  changes surrounding the diverticular segment of the sigmoid   colon. A  few punctate foci of extraluminal air extend superior to the   inflamed  sigmoid colon (series 3, image 172). Findings are compatible with  acute sigmoid diverticulitis with perforation. No abscess. No   small  bowel or colonic obstruction. Normal appendix. Small hiatal   hernia.     PELVIC ORGANS: Normal.     ADDITIONAL FINDINGS: No lymphadenopathy in the abdomen and   pelvis. A  few prominent right mesenteric lymph nodes are likely reactive.   No  free fluid.     MUSCULOSKELETAL: Normal.                                                                      IMPRESSION:   1.  Acute sigmoid diverticulitis with perforation and few small   foci  of adjacent free air. No abscess.     NERY DAMICO MD      All imaging studies reviewed by me.     Huang David MD, FACS        Basic metabolic panel   Result Value Ref  Range    Sodium 136 133 - 144 mmol/L    Potassium 3.9 3.4 - 5.3 mmol/L    Chloride 106 94 - 109 mmol/L    Carbon Dioxide 27 20 - 32 mmol/L    Anion Gap 3 3 - 14 mmol/L    Glucose 89 70 - 99 mg/dL    Urea Nitrogen 7 7 - 30 mg/dL    Creatinine 0.90 0.66 - 1.25 mg/dL    GFR Estimate >90 >60 mL/min/[1.73_m2]    GFR Estimate If Black >90 >60 mL/min/[1.73_m2]    Calcium 8.6 8.5 - 10.1 mg/dL   WBC count   Result Value Ref Range    WBC 7.6 4.0 - 11.0 10e9/L           A: perforated diverticulitis, campylobacter diarrhea  Improving with iv flagyl, rocephin, po zithromax    P: WBC normal today  OK for advancing diet as able and transitioning to all PO abx  Likely home in am. Unlikely to need operation  FOLLOW-UP with Dr David in next few weeks to set up colonoscopy in ~6 weeks    Geovanny Garcia MD

## 2021-06-13 VITALS
BODY MASS INDEX: 32.6 KG/M2 | WEIGHT: 254 LBS | OXYGEN SATURATION: 98 % | HEART RATE: 82 BPM | DIASTOLIC BLOOD PRESSURE: 82 MMHG | HEIGHT: 74 IN | RESPIRATION RATE: 14 BRPM | TEMPERATURE: 98.9 F | SYSTOLIC BLOOD PRESSURE: 137 MMHG

## 2021-06-13 LAB
PLATELET # BLD AUTO: 280 10E9/L (ref 150–450)
POTASSIUM SERPL-SCNC: 3.8 MMOL/L (ref 3.4–5.3)

## 2021-06-13 PROCEDURE — 36415 COLL VENOUS BLD VENIPUNCTURE: CPT | Performed by: INTERNAL MEDICINE

## 2021-06-13 PROCEDURE — 250N000013 HC RX MED GY IP 250 OP 250 PS 637: Performed by: PEDIATRICS

## 2021-06-13 PROCEDURE — 99238 HOSP IP/OBS DSCHRG MGMT 30/<: CPT | Performed by: PEDIATRICS

## 2021-06-13 PROCEDURE — 84132 ASSAY OF SERUM POTASSIUM: CPT | Performed by: INTERNAL MEDICINE

## 2021-06-13 PROCEDURE — 85049 AUTOMATED PLATELET COUNT: CPT | Performed by: INTERNAL MEDICINE

## 2021-06-13 RX ADMIN — AZITHROMYCIN MONOHYDRATE 500 MG: 250 TABLET ORAL at 08:21

## 2021-06-13 RX ADMIN — OXYCODONE HYDROCHLORIDE 5 MG: 5 TABLET ORAL at 05:01

## 2021-06-13 RX ADMIN — METRONIDAZOLE 500 MG: 500 TABLET ORAL at 08:21

## 2021-06-13 ASSESSMENT — ACTIVITIES OF DAILY LIVING (ADL)
ADLS_ACUITY_SCORE: 11

## 2021-06-13 NOTE — PLAN OF CARE
S-(situation): shift note    B-(background): Diverticulitis with perferation    A-(assessment): patient alert and oriented lungs clear and vitals stable he is afebrile. He complained of some anxiety last night and received an ativan with good results. He received one oxycodone with good results abdomen with tenderness upon palpation to left lower quadrant. No nausea. Bowel tones present and tolerating oral intake, he denied any stools overnight    R-(recommendations): continue to monitor vitals and abdominal pain. Continue oral antibiotics. Notify MD of any changes

## 2021-06-13 NOTE — DISCHARGE SUMMARY
[facility]  Hospitalist Discharge Summary      Date of Admission:  6/10/2021  Date of Discharge:  6/13/2021  8:50 AM  Discharging Provider: Pedro Schwab MD      Discharge Diagnoses   Principal Problem:    Diverticulitis of large intestine with perforation without bleeding  Active Problems:    Campylobacter diarrhea    SIRS (systemic inflammatory response syndrome) (H)-tachycardia, leukocytosis    Hypopotassemia      Follow-ups Needed After Discharge   Follow-up Appointments     Follow-up and recommended labs and tests       Follow up with Dr. David at [facility] within 1-2 weeks  for hospital   follow- up. The following labs/tests are recommended: colonoscopy in 6   weeks.             Discharge Disposition   Discharged to home  Condition at discharge: Stable      Hospital Course   Generally healthy 35-year-old man presented with 5-day history of persistent watery diarrhea and 1 day history of severe left lower quadrant abdominal pain.  Due to concern for acute diverticulitis with microperforation, he was admitted.  Stool testing for enteric pathogens was positive for Campylobacter infection.  Because of persistent diarrheal illness, systemic inflammatory response syndrome, and concern for possible enteroinvasive infection, Campylobacter was treated with a 3-day course of Zithromax during hospitalization.  General surgery was consulted regarding microperforation of diverticulitis and recommended nonoperative management.  He was also treated with parenteral antibiotics for diverticulitis as well as IV fluids and bowel rest.  He improved and subsequently tolerated advancing diet.  Ongoing symptoms were managed with oral medications by discharge.  He was advised to complete a 1 week treatment course of metronidazole and cefdinir for diverticulitis.  Outpatient follow-up with general surgery in 1 to 2 weeks was recommended with plan to arrange colonoscopy in 6 weeks.    Consultations  This Hospital Stay   SURGERY GENERAL IP CONSULT    Code Status   Full Code    Time Spent on this Encounter   I, Pedro Schwab MD, personally saw the patient today and spent less than or equal to 30 minutes discharging this patient.       Pedro Schwab MD  [address] MEDICAL SURGICAL  [address] [place] [address]  Phone: [phone]  ______________________________________________________________________    Physical Exam   Vital Signs: Temp: 97.8  F (36.6  C) Temp src: Oral BP: 125/73 Pulse: 84   Resp: 16 SpO2: 99 % O2 Device: None (Room air)    Weight: 254 lbs 0 oz  General Appearance: Appears comfortable resting in bed, no acute distress  GI: Nondistended abdomen, soft, essentially no left lower quadrant abdominal tenderness       Primary Care Physician   Physician No Ref-Primary    Discharge Orders      Reason for your hospital stay    Hospitalized due to Campylobacter intestinal infection and acute diverticulitis with microperforation and improved     Follow-up and recommended labs and tests     Follow up with Dr. David at [facility] within 1-2 weeks  for hospital follow- up. The following labs/tests are recommended: colonoscopy in 6 weeks.     Activity    Your activity upon discharge: activity as tolerated     Diet    Follow this diet upon discharge: Advance to a regular diet as tolerated       Significant Results and Procedures   Most Recent 3 CBC's:  Recent Labs   Lab Test 06/13/21 0626 06/12/21 0627 06/11/21 0652   WBC  --  7.6 11.3*   HGB  --   --  14.0   MCV  --   --  91     --  216     Most Recent 3 BMP's:  Recent Labs   Lab Test 06/13/21 0626 06/12/21 0627 06/11/21  0652 06/10/21  1235 06/10/21  1235   NA  --  136 136  --  133   POTASSIUM 3.8 3.9 3.8   < > 3.4   CHLORIDE  --  106 106  --  99   CO2  --  27 25  --  27   BUN  --  7 10  --  8   CR  --  0.90 1.00  --  0.94   ANIONGAP  --  3 5  --  7   KELLEN  --  8.6 8.6  --  9.6   GLC  --  89 88  --  103*    <  > = values in this interval not displayed.     Most Recent 2 LFT's:  Recent Labs   Lab Test 06/10/21  1235   AST 16   ALT 33   ALKPHOS 74   BILITOTAL 1.0       Results for orders placed or performed during the hospital encounter of 06/10/21   CT Abdomen Pelvis w Contrast    Narrative    CT ABDOMEN PELVIS W CONTRAST 6/10/2021 2:07 PM    CLINICAL HISTORY: Left lower quadrant pain    TECHNIQUE: CT scan of the abdomen and pelvis was performed following  injection of IV contrast. Multiplanar reformats were obtained. Dose  reduction techniques were used.  CONTRAST: 100 mL Isovue 370     COMPARISON: None.    FINDINGS:   LOWER CHEST: Normal.    HEPATOBILIARY: Normal.    PANCREAS: Normal.    SPLEEN: Normal.    ADRENAL GLANDS: Normal.    KIDNEYS/BLADDER: Normal.    BOWEL: Mild sigmoid diverticulosis. Wall thickening and inflammatory  changes surrounding the diverticular segment of the sigmoid colon. A  few punctate foci of extraluminal air extend superior to the inflamed  sigmoid colon (series 3, image 172). Findings are compatible with  acute sigmoid diverticulitis with perforation. No abscess. No small  bowel or colonic obstruction. Normal appendix. Small hiatal hernia.    PELVIC ORGANS: Normal.    ADDITIONAL FINDINGS: No lymphadenopathy in the abdomen and pelvis. A  few prominent right mesenteric lymph nodes are likely reactive. No  free fluid.    MUSCULOSKELETAL: Normal.      Impression    IMPRESSION:   1.  Acute sigmoid diverticulitis with perforation and few small foci  of adjacent free air. No abscess.    NERY DAMICO MD       Discharge Medications   Current Discharge Medication List      START taking these medications    Details   acetaminophen (TYLENOL) 500 MG tablet Take 2 tablets (1,000 mg) by mouth every 6 hours as needed for mild pain  Qty:      Associated Diagnoses: Diverticulitis of large intestine with perforation without bleeding      cefdinir (OMNICEF) 300 MG capsule Take 2 capsules (600 mg) by mouth daily  for 7 days  Qty: 14 capsule, Refills: 0    Associated Diagnoses: Diverticulitis of large intestine with perforation without bleeding      metroNIDAZOLE (FLAGYL) 500 MG tablet Take 1 tablet (500 mg) by mouth 3 times daily for 7 days  Qty: 21 tablet, Refills: 0    Associated Diagnoses: Diverticulitis of large intestine with perforation without bleeding      oxyCODONE (ROXICODONE) 5 MG tablet Take 1 tablet (5 mg) by mouth every 4 hours as needed for moderate to severe pain  Qty: 10 tablet, Refills: 0    Associated Diagnoses: Diverticulitis of large intestine with perforation without bleeding         CONTINUE these medications which have NOT CHANGED    Details   gabapentin (NEURONTIN) 300 MG capsule Take 3 capsules by mouth nightly as needed (nerve pain).  Qty: 30 capsule, Refills: 0    Associated Diagnoses: HNP (herniated nucleus pulposus), lumbar      methocarbamol (ROBAXIN) 750 MG tablet Take 1 tablet by mouth every 6 hours as needed.  Qty: 90 tablet, Refills: 0    Associated Diagnoses: HNP (herniated nucleus pulposus), lumbar         STOP taking these medications       oxycodone-acetaminophen (PERCOCET) 5-325 MG per tablet Comments:   Reason for Stopping:             Allergies   No Known Allergies

## 2021-06-13 NOTE — PROGRESS NOTES
Situation: Shift note four hour     Background: Diverticulitis     Assessment: Pt is A &O x4. Pain is a 3/10 in the LLQ of abd. Pt states that this is a reduction in the pain level he was feeling earlier. PRN pain meds given prior to shift change. Pt tolerating reg diet. LS CTA. BS are audible in all four quads. Slight tenderness in the LLQ upon palpation. No nausea reported. Indept in room. Skin intact. IV is SL at this time. No complaints at this time and pt is resting with eyes closed.     Will continue to monitor.

## 2021-06-13 NOTE — PLAN OF CARE
S-(situation): Patient discharged to home via wheelchair to door with wife.    B-(background): Diverticulitis with perforation    A-(assessment): Rated pain 1/10 after this morning's Oxycodone. Slight tenderness in LLQ to palpation. Afebrile. Voiding, had BM last night, and tolerating regular diet. Up independently. Anxious to get home.     R-(recommendations): Discharge instructions reviewed with patient.  Listed belongings gathered and returned to patient. Will follow up with Dr. David 6-17-21.        Discharge Nursing Criteria:     Care Plan and Patient education resolved: Yes    New Medications- pt has been educated about purpose and side effects: Yes    Vaccines  Influenza status verified at discharge:  Not Applicable; not in season        MISC  Prescriptions if needed, hard copies sent with patient  Yes  Home medications returned to patient: NA  Medication Bin checked and emptied on discharge Yes  Patient reports post-discharge pain management plan is effective: Yes

## 2021-06-17 ENCOUNTER — OFFICE VISIT (OUTPATIENT)
Dept: SURGERY | Facility: CLINIC | Age: 36
End: 2021-06-17
Payer: COMMERCIAL

## 2021-06-17 ENCOUNTER — TELEPHONE (OUTPATIENT)
Dept: SURGERY | Facility: OTHER | Age: 36
End: 2021-06-17

## 2021-06-17 VITALS
BODY MASS INDEX: 32.68 KG/M2 | SYSTOLIC BLOOD PRESSURE: 124 MMHG | TEMPERATURE: 97.2 F | WEIGHT: 254.5 LBS | DIASTOLIC BLOOD PRESSURE: 60 MMHG

## 2021-06-17 DIAGNOSIS — K57.20 PERFORATED DIVERTICULUM OF LARGE INTESTINE: Primary | ICD-10-CM

## 2021-06-17 DIAGNOSIS — Z11.59 ENCOUNTER FOR SCREENING FOR OTHER VIRAL DISEASES: ICD-10-CM

## 2021-06-17 PROCEDURE — 99213 OFFICE O/P EST LOW 20 MIN: CPT | Performed by: SPECIALIST

## 2021-06-17 NOTE — PROGRESS NOTES
Follow-up for diverticulitis    Subjective:  Patient feels good.  No complaints.  All symptoms have resolved.    Objective:  B/P: 124/60, T: 97.2, P: Data Unavailable, R: Data Unavailable  Abdomen: Soft, nontender, nondistended      Assessment/plan:  This 35 old gentleman with an episode of perforated diverticulitis.  It was a microperforation of the mesentery.  I did discuss the need for colonoscopy and he expressed understanding.  I discussion patient plan at this time is to proceed to colonoscopy as previous discussed.   The procedure, risks, benefits, and alternatives were discussed and the patient agrees to proceed.  He will be scheduled for 6 weeks now.    Huang David MD, FACS

## 2021-06-17 NOTE — LETTER
[facility]  [address]  [place] [address]  [phone]        June 17, 2021    Robin Velasco  [address] [place] [address]

## 2021-06-17 NOTE — LETTER
6/17/2021         RE: Robin Velasco  [address]  [place] [address]        Dear Colleague,    Thank you for referring your patient, Robin Velasco, to the [facility]. Please see a copy of my visit note below.    Follow-up for diverticulitis    Subjective:  Patient feels good.  No complaints.  All symptoms have resolved.    Objective:  B/P: 124/60, T: 97.2, P: Data Unavailable, R: Data Unavailable  Abdomen: Soft, nontender, nondistended      Assessment/plan:  This 35 old gentleman with an episode of perforated diverticulitis.  It was a microperforation of the mesentery.  I did discuss the need for colonoscopy and he expressed understanding.  I discussion patient plan at this time is to proceed to colonoscopy as previous discussed.   The procedure, risks, benefits, and alternatives were discussed and the patient agrees to proceed.  He will be scheduled for 6 weeks now.    Huang David MD, FACS        Again, thank you for allowing me to participate in the care of your patient.        Sincerely,        Huang David MD

## 2021-06-17 NOTE — NURSING NOTE
[facility] Surgical Services    Robin Velasco has been given the following teaching information:  Andrew: Colonoscopy     age(85 years old or older)

## 2021-06-17 NOTE — LETTER

## 2021-06-17 NOTE — TELEPHONE ENCOUNTER
Date of procedure: 7/23/21  Colonoscopy  Surgeon: Dr. David  Prep:Miralax  Packet:Colonoscopy/EGD instructions mailed to patient's home address.   Date: 6/17/2021      Surgery Scheduler

## 2021-06-25 NOTE — PROGRESS NOTES
"Robin Velasco  Gender: male  : 1985  [address]  [place] [address]  [phone] (home)     Medical Record: [id number]  Pharmacy: [id number] - [place], [place] - [address]  Primary Care Provider: No Ref-Primary, Physician    Parent's names are: Estelita Velasco (mother) and Data Unavailable (father).      [facility]  2021     Discharge Phone Call:  Key Words/Key Times      Introduction - AIDET (Acknowledge, Introduce, Duration, Explanation)      Empathy-   We are calling to see how you are since your recent stay in the hospital?     Call back COMMENTS: \"Excellent.\"      Clinical Questions -  (f/u appts, medication side effects/purpose, ability to care for self at home) \"For your safety, it is important to us that you understand the purpose and side effects of your medications, can you tell me what your new medications are?\"     Call back COMMENTS: Followed up with Dr. David. No questions about his care and is done with his meds.       Staff Recognition -  We like to recognize staff and physicians who have done an excellent job.  Do you remember any people from your care team that you would like recognize?     Call back COMMENTS: Could not remember names.       Very Good Care -  We want to provide very good care to all patients.  How was your care?     Call back COMMENTS: \"All did a great job.\"      Opportunities for Improvement -  Our goal is to be the best.  Do you have any suggestions for things that we could improve upon?     Call back COMMENTS: none      Thank You           "

## 2021-07-27 ENCOUNTER — LAB (OUTPATIENT)
Dept: LAB | Facility: CLINIC | Age: 36
End: 2021-07-27
Payer: COMMERCIAL

## 2021-07-27 DIAGNOSIS — Z11.59 ENCOUNTER FOR SCREENING FOR OTHER VIRAL DISEASES: ICD-10-CM

## 2021-07-27 PROCEDURE — U0003 INFECTIOUS AGENT DETECTION BY NUCLEIC ACID (DNA OR RNA); SEVERE ACUTE RESPIRATORY SYNDROME CORONAVIRUS 2 (SARS-COV-2) (CORONAVIRUS DISEASE [COVID-19]), AMPLIFIED PROBE TECHNIQUE, MAKING USE OF HIGH THROUGHPUT TECHNOLOGIES AS DESCRIBED BY CMS-2020-01-R: HCPCS

## 2021-07-27 PROCEDURE — U0005 INFEC AGEN DETEC AMPLI PROBE: HCPCS

## 2021-07-28 LAB — SARS-COV-2 RNA RESP QL NAA+PROBE: NEGATIVE

## 2021-07-29 NOTE — H&P
Surgery    Patient is a 36-year-old white male well-known to me for perforated diverticulitis.  Last seen 6 weeks ago.  He is much improved.  He presents today for his colonoscopy.  He has no complaints or change since his last H&P.  Proceed to Colonoscopy as planned.  The procedure, risks(bleeding, perforation), benefits and alternatives were discussed and the patient agrees to proceed. Cleared for Anesthesia    Huang David MD, FACS

## 2021-07-30 ENCOUNTER — HOSPITAL ENCOUNTER (OUTPATIENT)
Facility: CLINIC | Age: 36
Discharge: HOME | End: 2021-07-30
Attending: SPECIALIST | Admitting: SPECIALIST
Payer: COMMERCIAL

## 2021-07-30 ENCOUNTER — ANESTHESIA (OUTPATIENT)
Dept: GASTROENTEROLOGY | Facility: CLINIC | Age: 36
End: 2021-07-30
Payer: COMMERCIAL

## 2021-07-30 ENCOUNTER — ANESTHESIA EVENT (OUTPATIENT)
Dept: GASTROENTEROLOGY | Facility: CLINIC | Age: 36
End: 2021-07-30
Payer: COMMERCIAL

## 2021-07-30 VITALS
DIASTOLIC BLOOD PRESSURE: 89 MMHG | WEIGHT: 254 LBS | BODY MASS INDEX: 32.6 KG/M2 | TEMPERATURE: 97.7 F | HEIGHT: 74 IN | HEART RATE: 79 BPM | SYSTOLIC BLOOD PRESSURE: 123 MMHG | RESPIRATION RATE: 16 BRPM | OXYGEN SATURATION: 96 %

## 2021-07-30 DIAGNOSIS — K57.20 PERFORATED DIVERTICULUM OF LARGE INTESTINE: ICD-10-CM

## 2021-07-30 LAB — COLONOSCOPY: NORMAL

## 2021-07-30 PROCEDURE — 250N000011 HC RX IP 250 OP 636: Performed by: NURSE ANESTHETIST, CERTIFIED REGISTERED

## 2021-07-30 PROCEDURE — 258N000003 HC RX IP 258 OP 636: Performed by: SPECIALIST

## 2021-07-30 PROCEDURE — 45378 DIAGNOSTIC COLONOSCOPY: CPT | Performed by: SPECIALIST

## 2021-07-30 PROCEDURE — 250N000009 HC RX 250: Performed by: NURSE ANESTHETIST, CERTIFIED REGISTERED

## 2021-07-30 PROCEDURE — 370N000017 HC ANESTHESIA TECHNICAL FEE, PER MIN: Performed by: SPECIALIST

## 2021-07-30 PROCEDURE — 250N000009 HC RX 250: Performed by: SPECIALIST

## 2021-07-30 RX ORDER — SODIUM CHLORIDE, SODIUM LACTATE, POTASSIUM CHLORIDE, CALCIUM CHLORIDE 600; 310; 30; 20 MG/100ML; MG/100ML; MG/100ML; MG/100ML
INJECTION, SOLUTION INTRAVENOUS CONTINUOUS
Status: DISCONTINUED | OUTPATIENT
Start: 2021-07-30 | End: 2021-07-30 | Stop reason: HOSPADM

## 2021-07-30 RX ORDER — PROPOFOL 10 MG/ML
INJECTION, EMULSION INTRAVENOUS PRN
Status: DISCONTINUED | OUTPATIENT
Start: 2021-07-30 | End: 2021-07-30

## 2021-07-30 RX ORDER — LIDOCAINE 40 MG/G
CREAM TOPICAL
Status: DISCONTINUED | OUTPATIENT
Start: 2021-07-30 | End: 2021-07-30 | Stop reason: HOSPADM

## 2021-07-30 RX ORDER — LIDOCAINE HYDROCHLORIDE 20 MG/ML
INJECTION, SOLUTION INFILTRATION; PERINEURAL PRN
Status: DISCONTINUED | OUTPATIENT
Start: 2021-07-30 | End: 2021-07-30

## 2021-07-30 RX ORDER — PROPOFOL 10 MG/ML
INJECTION, EMULSION INTRAVENOUS CONTINUOUS PRN
Status: DISCONTINUED | OUTPATIENT
Start: 2021-07-30 | End: 2021-07-30

## 2021-07-30 RX ADMIN — LIDOCAINE HYDROCHLORIDE 60 MG: 20 INJECTION, SOLUTION INFILTRATION; PERINEURAL at 10:08

## 2021-07-30 RX ADMIN — SODIUM CHLORIDE, POTASSIUM CHLORIDE, SODIUM LACTATE AND CALCIUM CHLORIDE: 600; 310; 30; 20 INJECTION, SOLUTION INTRAVENOUS at 09:59

## 2021-07-30 RX ADMIN — LIDOCAINE HYDROCHLORIDE 0.1 ML: 10 INJECTION, SOLUTION EPIDURAL; INFILTRATION; INTRACAUDAL; PERINEURAL at 10:00

## 2021-07-30 RX ADMIN — PROPOFOL 50 MG: 10 INJECTION, EMULSION INTRAVENOUS at 10:11

## 2021-07-30 RX ADMIN — PROPOFOL 50 MG: 10 INJECTION, EMULSION INTRAVENOUS at 10:09

## 2021-07-30 RX ADMIN — PROPOFOL 200 MCG/KG/MIN: 10 INJECTION, EMULSION INTRAVENOUS at 10:11

## 2021-07-30 ASSESSMENT — MIFFLIN-ST. JEOR: SCORE: 2146.89

## 2021-07-30 NOTE — DISCHARGE INSTRUCTIONS
[facility]    Home Care Following Endoscopy          Activity:    You have just undergone an endoscopic procedure usually performed with conscious sedation.  Do not work or operate machinery (including a car) for at least 12 hours.      I encourage you to walk and attempt to pass this air as soon as possible.    Diet:    Return to the diet you were on before your procedure but eat lightly for the first 12-24 hours.    Drink plenty of water.    Resume any regular medications unless otherwise advised by your physician.  Please begin any new medication prescribed as a result of your procedure as directed by your physician.     If you had any biopsy or polyp removed please refrain from aspirin or aspirin products for 2 days.  If on Coumadin please restart as instructed by your physician.   Pain:    You may take Tylenol as needed for pain.  Expected Recovery:    You can expect some mild abdominal fullness and/or discomfort due to the air used to inflate your intestinal tract. It is also normal to have a mild sore throat after upper endoscopy.    Call Your Physician if You Have:      After Colonoscopy:  o Worsening persisting abdominal pain which is worse with activity.  o Fevers (>101 degrees F), chills or shakes.  o Passage of continued blood with bowel movements.   Any questions or concerns about your recovery, please call [phone] or after hours [phone] Nurse Advice Line.    Follow-up Care:    You should receive a call or letter with your results within 1 week. Please call if you have not received a notification of your results.  If asked to return to clinic please make an appointment 1 week after your procedure.  Call [phone].

## 2021-07-30 NOTE — ANESTHESIA PREPROCEDURE EVALUATION
Anesthesia Pre-Procedure Evaluation    Patient: Robin Velasco   MRN: [id number] : 1985        Preoperative Diagnosis: Perforated diverticulum of large intestine [K57.20]   Procedure : Procedure(s):  COLONOSCOPY     No past medical history on file.   Past Surgical History:   Procedure Laterality Date     DISCECTOMY LUMBAR POSTERIOR MICROSCOPIC ONE LEVEL  10/18/2011    Procedure:DISCECTOMY LUMBAR POSTERIOR MICROSCOPIC ONE LEVEL; LEFT L5-S1 HEMILAMINECTOMY MICRODISCECTOMY WITH METRIX II ; Surgeon:APRIL SEGURA; Location:[facility] OR     ENT SURGERY        No Known Allergies   Social History     Tobacco Use     Smoking status: Never Smoker     Smokeless tobacco: Never Used   Substance Use Topics     Alcohol use: Yes     Comment: 1 month      Wt Readings from Last 1 Encounters:   21 115.4 kg (254 lb 8 oz)        Anesthesia Evaluation   Pt has had prior anesthetic. Type: General.    No history of anesthetic complications       ROS/MED HX  ENT/Pulmonary:  - neg pulmonary ROS     Neurologic:  - neg neurologic ROS     Cardiovascular:  - neg cardiovascular ROS     METS/Exercise Tolerance:     Hematologic:  - neg hematologic  ROS     Musculoskeletal:  - neg musculoskeletal ROS     GI/Hepatic:     (+) bowel prep,     Renal/Genitourinary:  - neg Renal ROS     Endo:  - neg endo ROS     Psychiatric/Substance Use:  - neg psychiatric ROS     Infectious Disease:  - neg infectious disease ROS     Malignancy:  - neg malignancy ROS     Other:  - neg other ROS          Physical Exam    Airway        Mallampati: I   TM distance: > 3 FB   Neck ROM: full   Mouth opening: > 3 cm    Respiratory Devices and Support         Dental  no notable dental history         Cardiovascular   cardiovascular exam normal          Pulmonary   pulmonary exam normal                OUTSIDE LABS:  CBC:   Lab Results   Component Value Date    WBC 7.6 2021    WBC 11.3 (H) 2021    HGB 14.0 2021    HCT 41.1 2021      06/13/2021     06/11/2021     BMP:   Lab Results   Component Value Date     06/12/2021     06/11/2021    POTASSIUM 3.8 06/13/2021    POTASSIUM 3.9 06/12/2021    CHLORIDE 106 06/12/2021    CHLORIDE 106 06/11/2021    CO2 27 06/12/2021    CO2 25 06/11/2021    BUN 7 06/12/2021    BUN 10 06/11/2021    CR 0.90 06/12/2021    CR 1.00 06/11/2021    GLC 89 06/12/2021    GLC 88 06/11/2021     COAGS: No results found for: PTT, INR, FIBR  POC:   Lab Results   Component Value Date     (H) 10/19/2011     HEPATIC:   Lab Results   Component Value Date    ALBUMIN 3.7 06/10/2021    PROTTOTAL 7.9 06/10/2021    ALT 33 06/10/2021    AST 16 06/10/2021    ALKPHOS 74 06/10/2021    BILITOTAL 1.0 06/10/2021     OTHER:   Lab Results   Component Value Date    LACT 1.1 06/10/2021    KELLEN 8.6 06/12/2021       Anesthesia Plan    ASA Status:  1   NPO Status:  NPO Appropriate    Anesthesia Type: MAC.     - Reason for MAC: immobility needed      Maintenance: TIVA.        Consents    Anesthesia Plan(s) and associated risks, benefits, and realistic alternatives discussed. Questions answered and patient/representative(s) expressed understanding.     - Discussed with:  Patient      - Extended Intubation/Ventilatory Support Discussed: No.      - Patient is DNR/DNI Status: No    Use of blood products discussed: No .     Postoperative Care            Comments:                BRIDGETT Douglas CRNA

## 2021-07-30 NOTE — ANESTHESIA CARE TRANSFER NOTE
Patient: Robin Velasco    Procedure(s):  COLONOSCOPY    Diagnosis: Perforated diverticulum of large intestine [K57.20]  Diagnosis Additional Information: No value filed.    Anesthesia Type:   MAC     Note:    Oropharynx: oropharynx clear of all foreign objects and spontaneously breathing  Level of Consciousness: drowsy  Oxygen Supplementation: room air    Independent Airway: airway patency satisfactory and stable  Dentition: dentition unchanged  Vital Signs Stable: post-procedure vital signs reviewed and stable  Report to RN Given: handoff report given  Patient transferred to: Phase II    Handoff Report: Identifed the Patient, Identified the Reponsible Provider, Reviewed the pertinent medical history, Discussed the surgical course, Reviewed Intra-OP anesthesia mangement and issues during anesthesia, Set expectations for post-procedure period and Allowed opportunity for questions and acknowledgement of understanding      Vitals:  Vitals Value Taken Time   BP     Temp     Pulse     Resp     SpO2 96 % 07/30/21 1036   Vitals shown include unvalidated device data.    Electronically Signed By: BRIDGETT Douglas CRNA  July 30, 2021  10:37 AM

## 2021-07-30 NOTE — ANESTHESIA POSTPROCEDURE EVALUATION
Patient: Robin Velasco    Procedure(s):  COLONOSCOPY    Diagnosis:Perforated diverticulum of large intestine [K57.20]  Diagnosis Additional Information: No value filed.    Anesthesia Type:  MAC    Note:  Disposition: Outpatient   Postop Pain Control: Uneventful            Sign Out: Well controlled pain   PONV: No   Neuro/Psych: Uneventful            Sign Out: Acceptable/Baseline neuro status   Airway/Respiratory: Uneventful            Sign Out: Acceptable/Baseline resp. status   CV/Hemodynamics: Uneventful            Sign Out: Acceptable CV status   Other NRE: NONE   DID A NON-ROUTINE EVENT OCCUR? No    Event details/Postop Comments:  Pt was happy with anesthesia care.  No complications.  I will follow up with the pt if needed.           Last vitals:  Vitals Value Taken Time   /89 07/30/21 1045   Temp     Pulse 79 07/30/21 1045   Resp 16 07/30/21 1045   SpO2 96 % 07/30/21 1045   Vitals shown include unvalidated device data.    Electronically Signed By: BRIDGETT Douglas CRNA  July 30, 2021  11:44 AM

## 2021-07-31 ENCOUNTER — HEALTH MAINTENANCE LETTER (OUTPATIENT)
Age: 36
End: 2021-07-31

## 2021-09-25 ENCOUNTER — HEALTH MAINTENANCE LETTER (OUTPATIENT)
Age: 36
End: 2021-09-25

## 2022-08-27 ENCOUNTER — HEALTH MAINTENANCE LETTER (OUTPATIENT)
Age: 37
End: 2022-08-27

## 2023-01-07 ENCOUNTER — HEALTH MAINTENANCE LETTER (OUTPATIENT)
Age: 38
End: 2023-01-07

## 2023-09-23 ENCOUNTER — HEALTH MAINTENANCE LETTER (OUTPATIENT)
Age: 38
End: 2023-09-23

## (undated) RX ORDER — PROPOFOL 10 MG/ML
INJECTION, EMULSION INTRAVENOUS
Status: DISPENSED
Start: 2021-07-30

## (undated) RX ORDER — LIDOCAINE HYDROCHLORIDE 20 MG/ML
INJECTION, SOLUTION EPIDURAL; INFILTRATION; INTRACAUDAL; PERINEURAL
Status: DISPENSED
Start: 2021-07-30